# Patient Record
Sex: FEMALE | Race: WHITE | NOT HISPANIC OR LATINO | Employment: FULL TIME | URBAN - NONMETROPOLITAN AREA
[De-identification: names, ages, dates, MRNs, and addresses within clinical notes are randomized per-mention and may not be internally consistent; named-entity substitution may affect disease eponyms.]

---

## 2017-01-09 ENCOUNTER — LAB (OUTPATIENT)
Dept: LAB | Facility: HOSPITAL | Age: 27
End: 2017-01-09

## 2017-01-09 ENCOUNTER — OFFICE VISIT (OUTPATIENT)
Dept: GASTROENTEROLOGY | Facility: CLINIC | Age: 27
End: 2017-01-09

## 2017-01-09 VITALS
SYSTOLIC BLOOD PRESSURE: 132 MMHG | HEIGHT: 64 IN | WEIGHT: 137 LBS | BODY MASS INDEX: 23.39 KG/M2 | DIASTOLIC BLOOD PRESSURE: 82 MMHG | TEMPERATURE: 97.2 F | HEART RATE: 76 BPM

## 2017-01-09 DIAGNOSIS — K21.9 GASTROESOPHAGEAL REFLUX DISEASE WITHOUT ESOPHAGITIS: ICD-10-CM

## 2017-01-09 DIAGNOSIS — K51.00 ULCERATIVE PANCOLITIS WITHOUT COMPLICATION (HCC): Primary | ICD-10-CM

## 2017-01-09 DIAGNOSIS — K51.019 ULCERATIVE PANCOLITIS WITH COMPLICATION (HCC): ICD-10-CM

## 2017-01-09 LAB
ALBUMIN SERPL-MCNC: 4.2 G/DL (ref 3.5–5)
ALBUMIN/GLOB SERPL: 1.2 G/DL (ref 1.1–2.5)
ALP SERPL-CCNC: 60 U/L (ref 24–120)
ALT SERPL W P-5'-P-CCNC: 37 U/L (ref 0–54)
ANION GAP SERPL CALCULATED.3IONS-SCNC: 12 MMOL/L (ref 4–13)
AST SERPL-CCNC: 22 U/L (ref 7–45)
BASOPHILS # BLD AUTO: 0.03 10*3/MM3 (ref 0–0.2)
BASOPHILS NFR BLD AUTO: 0.3 % (ref 0–2)
BILIRUB SERPL-MCNC: 0.3 MG/DL (ref 0.1–1)
BUN BLD-MCNC: 11 MG/DL (ref 5–21)
BUN/CREAT SERPL: 12.6 (ref 7–25)
CALCIUM SPEC-SCNC: 9.1 MG/DL (ref 8.4–10.4)
CHLORIDE SERPL-SCNC: 104 MMOL/L (ref 98–110)
CO2 SERPL-SCNC: 25 MMOL/L (ref 24–31)
CREAT BLD-MCNC: 0.87 MG/DL (ref 0.5–1.4)
CRP SERPL-MCNC: <0.5 MG/DL (ref 0–0.99)
DEPRECATED RDW RBC AUTO: 48.8 FL (ref 40–54)
EOSINOPHIL # BLD AUTO: 0.11 10*3/MM3 (ref 0–0.7)
EOSINOPHIL NFR BLD AUTO: 1.2 % (ref 0–4)
ERYTHROCYTE [DISTWIDTH] IN BLOOD BY AUTOMATED COUNT: 17.3 % (ref 12–15)
GFR SERPL CREATININE-BSD FRML MDRD: 78 ML/MIN/1.73
GLOBULIN UR ELPH-MCNC: 3.6 GM/DL
GLUCOSE BLD-MCNC: 87 MG/DL (ref 70–100)
HCT VFR BLD AUTO: 35.1 % (ref 37–47)
HGB BLD-MCNC: 11 G/DL (ref 12–16)
IMM GRANULOCYTES # BLD: 0 10*3/MM3 (ref 0–0.03)
IMM GRANULOCYTES NFR BLD: 0 % (ref 0–5)
LYMPHOCYTES # BLD AUTO: 2.11 10*3/MM3 (ref 0.72–4.86)
LYMPHOCYTES NFR BLD AUTO: 22.2 % (ref 15–45)
MCH RBC QN AUTO: 24.6 PG (ref 28–32)
MCHC RBC AUTO-ENTMCNC: 31.3 G/DL (ref 33–36)
MCV RBC AUTO: 78.5 FL (ref 82–98)
MONOCYTES # BLD AUTO: 0.57 10*3/MM3 (ref 0.19–1.3)
MONOCYTES NFR BLD AUTO: 6 % (ref 4–12)
NEUTROPHILS # BLD AUTO: 6.67 10*3/MM3 (ref 1.87–8.4)
NEUTROPHILS NFR BLD AUTO: 70.3 % (ref 39–78)
PLATELET # BLD AUTO: 455 10*3/MM3 (ref 130–400)
PMV BLD AUTO: 10.1 FL (ref 6–12)
POTASSIUM BLD-SCNC: 4.1 MMOL/L (ref 3.5–5.3)
PROT SERPL-MCNC: 7.8 G/DL (ref 6.3–8.7)
RBC # BLD AUTO: 4.47 10*6/MM3 (ref 4.2–5.4)
SODIUM BLD-SCNC: 141 MMOL/L (ref 135–145)
WBC NRBC COR # BLD: 9.49 10*3/MM3 (ref 4.8–10.8)

## 2017-01-09 PROCEDURE — 86140 C-REACTIVE PROTEIN: CPT | Performed by: INTERNAL MEDICINE

## 2017-01-09 PROCEDURE — 80053 COMPREHEN METABOLIC PANEL: CPT | Performed by: INTERNAL MEDICINE

## 2017-01-09 PROCEDURE — 99213 OFFICE O/P EST LOW 20 MIN: CPT | Performed by: INTERNAL MEDICINE

## 2017-01-09 PROCEDURE — 85025 COMPLETE CBC W/AUTO DIFF WBC: CPT | Performed by: INTERNAL MEDICINE

## 2017-01-09 PROCEDURE — 36415 COLL VENOUS BLD VENIPUNCTURE: CPT

## 2017-01-09 PROCEDURE — 82542 COL CHROMOTOGRAPHY QUAL/QUAN: CPT | Performed by: INTERNAL MEDICINE

## 2017-01-09 RX ORDER — NICOTINE POLACRILEX 2 MG
GUM BUCCAL
COMMUNITY
End: 2017-07-10 | Stop reason: SDUPTHER

## 2017-01-09 NOTE — MR AVS SNAPSHOT
Maya Fields   1/9/2017 3:15 PM   Office Visit    Dept Phone:  [phone]   Encounter #:  [id number]    Provider:  Jordan Mcdaniel MD   Department:  [facility]                Your Full Care Plan              Your Updated Medication List          This list is accurate as of: 1/9/17  3:41 PM.  Always use your most recent med list.                azaTHIOprine 50 MG tablet   Commonly known as:  IMURAN   Take 2 tablets by mouth Daily.       BIFERARX 22-6-1-0.025 MG tablet   Generic drug:  Poly Fe Hzkgh-TzRgaNcdo-QS-B12       * Biotin 1 MG capsule       * Biotin 1 MG capsule       citalopram 10 MG tablet   Commonly known as:  CeleXA       dicyclomine 10 MG capsule   Commonly known as:  BENTYL       doxycycline 50 MG capsule   Commonly known as:  MONODOX       FE TABS 325 (65 FE) MG EC tablet   Generic drug:  ferrous sulfate       GIANVI 3-0.02 MG per tablet   Generic drug:  drospirenone-ethinyl estradiol       Iron (Ferrous Gluconate) 256 (28 FE) MG tablet       lansoprazole 15 MG capsule   Commonly known as:  PREVACID       mesalamine 0.375 G 24 hr capsule   Commonly known as:  APRISO   Take 4 capsules by mouth daily.       mirtazapine 15 MG tablet   Commonly known as:  REMERON       * PROBIOTIC PRODUCT PO       * PROBIOTIC PRODUCT PO       sertraline 50 MG tablet   Commonly known as:  ZOLOFT       * Notice:  This list has 4 medication(s) that are the same as other medications prescribed for you. Read the directions carefully, and ask your doctor or other care provider to review them with you.            Instructions     None    Patient Instructions History      Upcoming Appointments     Visit Type Date Time Department    FOLLOW UP 1/9/2017  3:15 PM [facility]    LAB 1/9/2017 11:00 AM  [facility]    OFFICE VISIT 7/10/2017  1:30 PM [facility]      MyChart Signup     [facility] [employer] allows you to send messages to your doctor, view your  "test results, renew your prescriptions, schedule appointments, and more. To sign up, go to [employer] and click on the Sign Up Now link in the New User? box. Enter your [employer] Activation Code exactly as it appears below along with the last four digits of your Social Security Number and your Date of Birth () to complete the sign-up process. If you do not sign up before the expiration date, you must request a new code.    [employer] Activation Code: [email or web address]  Expires: 2017  4:35 AM    If you have questions, you can email [email or web address] or call [phone] to talk to our [employer] staff. Remember, [employer] is NOT to be used for urgent needs. For medical emergencies, dial 911.               Other Info from Your Visit           Your Appointments     Jul 10, 2017  1:30 PM CDT   Office Visit with Jordan Mcdaniel MD   [facility] (--)    [address] [place] [id number] [phone]           Arrive 15 minutes prior to appointment.              Allergies     No Known Allergies      Reason for Visit     Follow-up colonoscopy      Vital Signs     Blood Pressure Pulse Temperature Height Weight Body Mass Index    132/82 (BP Location: Left arm, Patient Position: Sitting, Cuff Size: Adult) 76 97.2 °F (36.2 °C) 64\" (162.6 cm) 137 lb (62.1 kg) 23.52 kg/m2    Smoking Status                   Former Smoker             "

## 2017-01-09 NOTE — LETTER
January 9, 2017     Anand Zimmer MD  [address] [place] [address] [address]  [place] [address]    Patient: Maya Fields   YOB: 1990   Date of Visit: 1/9/2017       Dear Dr. Goran MD:    Thank you for referring Maya Fields to me for evaluation. Below are the relevant portions of my assessment and plan of care.         Maya was seen today for follow-up.    Diagnoses and all orders for this visit:    Ulcerative pancolitis without complication    Gastroesophageal reflux disease without esophagitis      Overall improved.  It still multiple difficult to tell because she never really had much symptoms although she had moderate pancolitis on colonoscopy.  Seems now that she is realized and she was having more symptoms than what she admitted to.  She is going to continue with Imuran 100 mg daily.  I told her she does not have to take in divided dose but can take 100 mg at one time each day.  She will continue with Apriso.  I will see her back here in 6 months.  At that time if she continues do well we will schedule her a colonoscopy to assure disease resolution and for surveillance.  We talked about colon cancer risk with ulcerative colitis again.  We talked about the side effects of the medications.  We talked about the need for repeat labs in 3 months.  I reviewed her labs today with her.  They were all unremarkable except for mild anemia which has improved.  She is scheduled to get labs again in 3 months at her primary care physician's office i.e. Dr. zimmer.  She agrees to ask to have those labs forwarded to me.  She understands that I do recommend routine lab checking approximately every 3-4 months.  Continue ongoing management by primary care provider and other specialists.     EMR Dragon/transcription disclaimer:  Much of this encounter note is electronic transcription/translation of spoken language to printed text.  The electronic translation of spoken language may be erroneous, or at times, nonsensical words or  phrases may be inadvertently transcribed.  Although I have reviewed the note for such errors, some may still exist.    Jordan Mcdaniel MD  3:49 PM  01/09/17                If you have questions, please do not hesitate to call me. I look forward to following Maya along with you.         Sincerely,        Jordan Mcdaniel MD        CC: No Recipients

## 2017-01-09 NOTE — PROGRESS NOTES
[facility]astro[facility]    Chief Complaint   Patient presents with   • Follow-up     colonoscopy       Subjective     HPI    Maya Fields is a 26 y.o. female who presents with a chief complaint of  ulcerative colitis.  When she was in early November, she just had a colonoscopy showing active disease.  She was downplaying her symptoms somewhat.  We elected to start Imuran 100 mg to be used with her Apriso.  She comes in now for follow-up evaluation.  She states she finally is truly feeling better.  She has 1 bowel movement a day.  Denies melena or bright red blood per rectum.  She is now eating a full meal without getting up to go to the bathroom.  She denies nocturnal symptoms.  She is gaining weight.  Denies any extraintestinal symptoms.  She does have a little bit nauseated with the a.m. dose of the Imuran.  She takes 50 mg twice a day and tolerates the p.m. dose quite well.       Past Medical History   Diagnosis Date   • Anemia    • GERD (gastroesophageal reflux disease)    • Nephrolithiasis    • UC (ulcerative colitis)      Diagnosis 2011, pancolitis         Current Outpatient Prescriptions:   •  azaTHIOprine (IMURAN) 50 MG tablet, Take 2 tablets by mouth Daily., Disp: 60 tablet, Rfl: 11  •  Biotin 1 MG capsule, by Does not apply route.  , Disp: , Rfl:   •  dicyclomine (BENTYL) 10 MG capsule, TAKE ONE CAPSULE BY MOUTH 3 TIMES A DAY **BEFORE MEALS**, Disp: , Rfl: 5  •  doxycycline (MONODOX) 50 MG capsule, , Disp: , Rfl:   •  ferrous sulfate (FE TABS) 325 (65 FE) MG EC tablet, Take 1 tablet by mouth 2 times daily for 30 days., Disp: , Rfl:   •  GIANVI 3-0.02 MG per tablet, , Disp: , Rfl:   •  lansoprazole (PREVACID) 15 MG capsule, Take 15 mg by mouth Daily., Disp: , Rfl:   •  mesalamine (APRISO) 0.375 G 24 hr capsule, Take 4 capsules by mouth daily., Disp: 120 capsule, Rfl: 11  •  Poly Fe Uxeje-TkVdrLqmc-IL-B12 (BIFERARX) 22-6-1-0.025 MG tablet, Take 1 tablet by mouth daily., Disp: , Rfl:   •  sertraline  (ZOLOFT) 50 MG tablet, Take 50 mg by mouth Daily., Disp: , Rfl:   •  Biotin 1 MG capsule, by Does not apply route.  , Disp: , Rfl:   •  citalopram (CeleXA) 10 MG tablet, TAKE 1 TABLET BY MOUTH EVERY DAY, Disp: , Rfl: 5  •  Iron, Ferrous Gluconate, 256 (28 FE) MG tablet, Take 1 tablet by mouth Daily., Disp: , Rfl:   •  mirtazapine (REMERON) 15 MG tablet, , Disp: , Rfl:   •  PROBIOTIC PRODUCT PO, Take  by mouth.  , Disp: , Rfl:   •  PROBIOTIC PRODUCT PO, Take  by mouth.  , Disp: , Rfl:     No Known Allergies    Social History     Social History   • Marital status: Single     Spouse name: N/A   • Number of children: N/A   • Years of education: N/A     Occupational History   • Not on file.     Social History Main Topics   • Smoking status: Former Smoker   • Smokeless tobacco: Never Used   • Alcohol use No   • Drug use: No   • Sexual activity: Not on file     Other Topics Concern   • Not on file     Social History Narrative       Family History   Problem Relation Age of Onset   • Colon cancer Paternal Grandfather        Review of Systems   Constitutional: Negative for chills and fever.   Cardiovascular: Negative for chest pain and palpitations.   Gastrointestinal: Negative for abdominal distention, abdominal pain, anal bleeding, blood in stool, constipation, diarrhea, nausea, rectal pain and vomiting.         Objective     Vitals:    01/09/17 1519   BP: 132/82   Pulse: 76   Temp: 97.2 °F (36.2 °C)       Physical Exam   Constitutional: She is cooperative. No distress.   HENT:   Head: Normocephalic and atraumatic. Head is without contusion.   Eyes: EOM are normal.   Neck: Neck supple. No JVD present.   Cardiovascular: Normal rate, regular rhythm, S1 normal, S2 normal and normal heart sounds.    No murmur heard.  Pulmonary/Chest: Effort normal and breath sounds normal. No apnea. No respiratory distress.   Abdominal: Soft. Bowel sounds are normal. She exhibits no distension and no mass. There is no hepatosplenomegaly. There  is no tenderness. There is no rebound, no guarding and negative Mariano's sign.   Musculoskeletal: She exhibits no edema or tenderness.   Neurological: She is alert.   Skin: She is not diaphoretic. No pallor.   Psychiatric: She has a normal mood and affect. Her behavior is normal.   Nursing note and vitals reviewed.        Assessment/Plan      Maya was seen today for follow-up.    Diagnoses and all orders for this visit:    Ulcerative pancolitis without complication    Gastroesophageal reflux disease without esophagitis      Overall improved.  It still multiple difficult to tell because she never really had much symptoms although she had moderate pancolitis on colonoscopy.  Seems now that she is realized and she was having more symptoms than what she admitted to.  She is going to continue with Imuran 100 mg daily.  I told her she does not have to take in divided dose but can take 100 mg at one time each day.  She will continue with Apriso.  I will see her back here in 6 months.  At that time if she continues do well we will schedule her a colonoscopy to assure disease resolution and for surveillance.  We talked about colon cancer risk with ulcerative colitis again.  We talked about the side effects of the medications.  We talked about the need for repeat labs in 3 months.  I reviewed her labs today with her.  They were all unremarkable except for mild anemia which has improved.  She is scheduled to get labs again in 3 months at her primary care physician's office i.e. Dr. beaulieu.  She agrees to ask to have those labs forwarded to me.  She understands that I do recommend routine lab checking approximately every 3-4 months.  Continue ongoing management by primary care provider and other specialists.     EMR Dragon/transcription disclaimer:  Much of this encounter note is electronic transcription/translation of spoken language to printed text.  The electronic translation of spoken language may be erroneous, or at times,  nonsensical words or phrases may be inadvertently transcribed.  Although I have reviewed the note for such errors, some may still exist.    Jordan Mcdaniel MD  3:49 PM  01/09/17

## 2017-01-16 LAB
6-TGN ENTSUB RBC: 57 PMOL/8X 10E8
6MMP ENTSUB RBC: 240 PMOL/8X 10E8

## 2017-04-12 ENCOUNTER — TRANSCRIBE ORDERS (OUTPATIENT)
Dept: ADMINISTRATIVE | Facility: HOSPITAL | Age: 27
End: 2017-04-12

## 2017-04-12 ENCOUNTER — APPOINTMENT (OUTPATIENT)
Dept: LAB | Facility: HOSPITAL | Age: 27
End: 2017-04-12
Attending: INTERNAL MEDICINE

## 2017-04-12 DIAGNOSIS — K51.90 ULCERATIVE COLITIS WITHOUT COMPLICATIONS, UNSPECIFIED LOCATION (HCC): ICD-10-CM

## 2017-04-12 DIAGNOSIS — D50.9 IRON DEFICIENCY ANEMIA, UNSPECIFIED: Primary | ICD-10-CM

## 2017-04-12 LAB
ALBUMIN SERPL-MCNC: 3.8 G/DL (ref 3.5–5)
ALBUMIN/GLOB SERPL: 1.3 G/DL (ref 1.1–2.5)
ALP SERPL-CCNC: 51 U/L (ref 24–120)
ALT SERPL W P-5'-P-CCNC: 22 U/L (ref 0–54)
ANION GAP SERPL CALCULATED.3IONS-SCNC: 10 MMOL/L (ref 4–13)
AST SERPL-CCNC: 29 U/L (ref 7–45)
BASOPHILS # BLD AUTO: 0.02 10*3/MM3 (ref 0–0.2)
BASOPHILS NFR BLD AUTO: 0.3 % (ref 0–2)
BILIRUB SERPL-MCNC: 0.4 MG/DL (ref 0.1–1)
BUN BLD-MCNC: 15 MG/DL (ref 5–21)
BUN/CREAT SERPL: 16.7 (ref 7–25)
CALCIUM SPEC-SCNC: 9.1 MG/DL (ref 8.4–10.4)
CHLORIDE SERPL-SCNC: 106 MMOL/L (ref 98–110)
CO2 SERPL-SCNC: 25 MMOL/L (ref 24–31)
CREAT BLD-MCNC: 0.9 MG/DL (ref 0.5–1.4)
CRP SERPL-MCNC: <0.5 MG/DL (ref 0–0.99)
DEPRECATED RDW RBC AUTO: 49.2 FL (ref 40–54)
EOSINOPHIL # BLD AUTO: 0.09 10*3/MM3 (ref 0–0.7)
EOSINOPHIL NFR BLD AUTO: 1.3 % (ref 0–4)
ERYTHROCYTE [DISTWIDTH] IN BLOOD BY AUTOMATED COUNT: 17 % (ref 12–15)
FERRITIN SERPL-MCNC: 4.26 NG/ML (ref 6.24–137)
GFR SERPL CREATININE-BSD FRML MDRD: 75 ML/MIN/1.73
GLOBULIN UR ELPH-MCNC: 3 GM/DL
GLUCOSE BLD-MCNC: 87 MG/DL (ref 70–100)
HCT VFR BLD AUTO: 31.9 % (ref 37–47)
HGB BLD-MCNC: 10.2 G/DL (ref 12–16)
IMM GRANULOCYTES # BLD: 0.02 10*3/MM3 (ref 0–0.03)
IMM GRANULOCYTES NFR BLD: 0.3 % (ref 0–5)
LYMPHOCYTES # BLD AUTO: 2.06 10*3/MM3 (ref 0.72–4.86)
LYMPHOCYTES NFR BLD AUTO: 28.9 % (ref 15–45)
MCH RBC QN AUTO: 25.5 PG (ref 28–32)
MCHC RBC AUTO-ENTMCNC: 32 G/DL (ref 33–36)
MCV RBC AUTO: 79.8 FL (ref 82–98)
MONOCYTES # BLD AUTO: 0.53 10*3/MM3 (ref 0.19–1.3)
MONOCYTES NFR BLD AUTO: 7.4 % (ref 4–12)
NEUTROPHILS # BLD AUTO: 4.41 10*3/MM3 (ref 1.87–8.4)
NEUTROPHILS NFR BLD AUTO: 61.8 % (ref 39–78)
PLATELET # BLD AUTO: 331 10*3/MM3 (ref 130–400)
PMV BLD AUTO: 10.5 FL (ref 6–12)
POTASSIUM BLD-SCNC: 3.9 MMOL/L (ref 3.5–5.3)
PROT SERPL-MCNC: 6.8 G/DL (ref 6.3–8.7)
RBC # BLD AUTO: 4 10*6/MM3 (ref 4.2–5.4)
SODIUM BLD-SCNC: 141 MMOL/L (ref 135–145)
WBC NRBC COR # BLD: 7.13 10*3/MM3 (ref 4.8–10.8)

## 2017-04-12 PROCEDURE — 80053 COMPREHEN METABOLIC PANEL: CPT | Performed by: INTERNAL MEDICINE

## 2017-04-12 PROCEDURE — 85025 COMPLETE CBC W/AUTO DIFF WBC: CPT | Performed by: INTERNAL MEDICINE

## 2017-04-12 PROCEDURE — 82728 ASSAY OF FERRITIN: CPT | Performed by: INTERNAL MEDICINE

## 2017-04-12 PROCEDURE — 86140 C-REACTIVE PROTEIN: CPT | Performed by: INTERNAL MEDICINE

## 2017-04-12 PROCEDURE — 36415 COLL VENOUS BLD VENIPUNCTURE: CPT

## 2017-07-10 ENCOUNTER — OFFICE VISIT (OUTPATIENT)
Dept: GASTROENTEROLOGY | Facility: CLINIC | Age: 27
End: 2017-07-10

## 2017-07-10 VITALS
OXYGEN SATURATION: 99 % | SYSTOLIC BLOOD PRESSURE: 126 MMHG | WEIGHT: 140 LBS | BODY MASS INDEX: 23.9 KG/M2 | TEMPERATURE: 98.5 F | DIASTOLIC BLOOD PRESSURE: 70 MMHG | HEART RATE: 72 BPM | HEIGHT: 64 IN

## 2017-07-10 DIAGNOSIS — K21.9 GASTROESOPHAGEAL REFLUX DISEASE WITHOUT ESOPHAGITIS: ICD-10-CM

## 2017-07-10 DIAGNOSIS — K51.00 ULCERATIVE PANCOLITIS WITHOUT COMPLICATION (HCC): Primary | ICD-10-CM

## 2017-07-10 PROCEDURE — 99213 OFFICE O/P EST LOW 20 MIN: CPT | Performed by: INTERNAL MEDICINE

## 2017-07-10 RX ORDER — SODIUM CHLORIDE 0.9 % (FLUSH) 0.9 %
1-10 SYRINGE (ML) INJECTION AS NEEDED
Status: CANCELLED | OUTPATIENT
Start: 2017-07-10

## 2017-07-10 RX ORDER — LIDOCAINE HYDROCHLORIDE 10 MG/ML
0.1 INJECTION, SOLUTION EPIDURAL; INFILTRATION; INTRACAUDAL; PERINEURAL ONCE AS NEEDED
Status: CANCELLED | OUTPATIENT
Start: 2017-07-10

## 2017-07-10 RX ORDER — BUSPIRONE HYDROCHLORIDE 15 MG/1
TABLET ORAL
COMMUNITY
Start: 2017-04-10 | End: 2020-09-14

## 2017-07-10 NOTE — PROGRESS NOTES
[facility] Gastroenterology    Chief Complaint   Patient presents with   • Follow-up       Subjective     HPI    Maya Fields is a 26 y.o. female who presents with a chief complaint of  Ulcerative colitis.  She is doing well.  She has been on Imuran since her colonoscopy approximately last October which showed pancolitis.  She states she is having one bowel movement a day.  If she gets a little anxious she might have to and around her menses she might have 2.  They are formed.  There is no mucus or blood.  Denies abdominal pain.  She has had no infectious symptoms.  Everything is going well.       Past Medical History:   Diagnosis Date   • Anemia    • GERD (gastroesophageal reflux disease)    • Nephrolithiasis    • UC (ulcerative colitis)     Diagnosis 2011, pancolitis       Past Surgical History:   Procedure Laterality Date   • CHOLECYSTECTOMY     • COLONOSCOPY     • COLONOSCOPY Left 10/17/2016    Procedure: COLONOSCOPY WITH ANESTHESIA;  Surgeon: Jordan Mcdaniel MD;  Location: [facility] ENDOSCOPY;  Service:          Current Outpatient Prescriptions:   •  azaTHIOprine (IMURAN) 50 MG tablet, Take 2 tablets by mouth Daily., Disp: 60 tablet, Rfl: 11  •  Biotin 1 MG capsule, by Does not apply route.  , Disp: , Rfl:   •  busPIRone (BUSPAR) 15 MG tablet, , Disp: , Rfl:   •  citalopram (CeleXA) 10 MG tablet, TAKE 1 TABLET BY MOUTH EVERY DAY, Disp: , Rfl: 5  •  doxycycline (MONODOX) 50 MG capsule, , Disp: , Rfl:   •  ferrous sulfate (FE TABS) 325 (65 FE) MG EC tablet, Take 1 tablet by mouth 2 times daily for 30 days., Disp: , Rfl:   •  GIANVI 3-0.02 MG per tablet, , Disp: , Rfl:   •  lansoprazole (PREVACID) 15 MG capsule, Take 15 mg by mouth Daily., Disp: , Rfl:   •  mesalamine (APRISO) 0.375 G 24 hr capsule, Take 4 capsules by mouth daily., Disp: 120 capsule, Rfl: 11  •  Poly Fe Valus-YiCvxRqav-FH-B12 (BIFERARX) 22-6-1-0.025 MG tablet, Take 1 tablet by mouth daily., Disp: , Rfl:   •  Sod Picosulfate-Mag Ox-Cit Acd  (PREPOPIK) 10-3.5-12 MG-GM-GM pack, Take 1 package by mouth See Admin Instructions., Disp: 1 each, Rfl: 0    No Known Allergies    Social History     Social History   • Marital status: Single     Spouse name: N/A   • Number of children: N/A   • Years of education: N/A     Occupational History   • Not on file.     Social History Main Topics   • Smoking status: Former Smoker   • Smokeless tobacco: Never Used   • Alcohol use No   • Drug use: No   • Sexual activity: Not on file     Other Topics Concern   • Not on file     Social History Narrative       Family History   Problem Relation Age of Onset   • Colon cancer Paternal Grandfather    • Crohn's disease Cousin        Review of Systems   Constitutional: Negative for chills and fever.   Cardiovascular: Negative for chest pain and palpitations.   Gastrointestinal: Negative for abdominal distention, abdominal pain, anal bleeding, blood in stool, constipation, diarrhea, nausea, rectal pain and vomiting.         Objective     Vitals:    07/10/17 1327   BP: 126/70   Pulse: 72   Temp: 98.5 °F (36.9 °C)   SpO2: 99%       Physical Exam  No acute distress. Vital signs as documented. Skin warm and dry and without overt rashes. EOMI, sclera anicteric.  Neck without JVD or masses. Lungs clear to auscultation bilaterally, no rales. Heart exam notable for regular rhythm, normal sounds and absence of loud murmurs, rubs or gallops. Abdomen is soft, nontender, non distended, normal bowel sounds and without evidence of organomegaly, masses, or abdominal aortic enlargement. Extremities nonedematous, no cyanosis. Neuro alert, moves extremities.    Assessment/Plan      Maya was seen today for follow-up.    Diagnoses and all orders for this visit:    Ulcerative pancolitis without complication  -     CBC & Differential  -     Comprehensive Metabolic Panel  -     C-reactive Protein  -     Case Request; Standing  -     sodium chloride 0.9 % flush 1-10 mL; Infuse 1-10 mL into a venous catheter  As Needed for Line Care.  -     lidocaine PF 1% (XYLOCAINE) injection 0.1 mL; Inject 0.1 mL into the skin 1 (One) Time As Needed (for IV access).  -     Case Request    Gastroesophageal reflux disease without esophagitis    Other orders  -     Implement Anesthesia Orders Day of Procedure; Standing  -     Obtain Informed Consent; Standing  -     Verify bowel prep was successful; Standing  -     Insert Peripheral IV; Standing  -     Saline Lock & Maintain IV Access; Standing  -     Sod Picosulfate-Mag Ox-Cit Acd (PREPOPIK) 10-3.5-12 MG-GM-GM pack; Take 1 package by mouth See Admin Instructions.      She is clinically doing well.  She is tolerating Imuran and Apriso and will continue.  We will plan a colonoscopy this October 1 year from the last one to see how her mucosa has responded to the Imuran.  Symptomatically she is done quite well.  Get labs.  I did discuss with her the need to get labs every 3-4 months approximately.  Also advised a flu shot this coming October November.  I will see her back in the office in one year and in October for a colonoscopy.  Continue ongoing management by primary care provider and other specialists.     EMR Dragon/transcription disclaimer:  Much of this encounter note is electronic transcription/translation of spoken language to printed text.  The electronic translation of spoken language may be erroneous, or at times, nonsensical words or phrases may be inadvertently transcribed.  Although I have reviewed the note for such errors, some may still exist.    Jordan Mcdaniel MD  2:04 PM  07/10/17

## 2018-01-22 RX ORDER — MESALAMINE 0.38 G/1
1500 CAPSULE, EXTENDED RELEASE ORAL DAILY
Qty: 360 CAPSULE | Refills: 0 | Status: SHIPPED | OUTPATIENT
Start: 2018-01-22 | End: 2018-02-06 | Stop reason: SDUPTHER

## 2018-02-06 ENCOUNTER — OFFICE VISIT (OUTPATIENT)
Dept: GASTROENTEROLOGY | Facility: CLINIC | Age: 28
End: 2018-02-06

## 2018-02-06 VITALS
TEMPERATURE: 97.2 F | SYSTOLIC BLOOD PRESSURE: 126 MMHG | BODY MASS INDEX: 22.71 KG/M2 | HEIGHT: 64 IN | DIASTOLIC BLOOD PRESSURE: 84 MMHG | OXYGEN SATURATION: 100 % | WEIGHT: 133 LBS | HEART RATE: 70 BPM

## 2018-02-06 DIAGNOSIS — K51.00 ULCERATIVE PANCOLITIS WITHOUT COMPLICATION (HCC): Primary | ICD-10-CM

## 2018-02-06 PROCEDURE — 99213 OFFICE O/P EST LOW 20 MIN: CPT | Performed by: NURSE PRACTITIONER

## 2018-02-06 RX ORDER — MESALAMINE 0.38 G/1
1500 CAPSULE, EXTENDED RELEASE ORAL DAILY
Qty: 120 CAPSULE | Refills: 10 | Status: SHIPPED | OUTPATIENT
Start: 2018-02-06 | End: 2020-07-02 | Stop reason: SDUPTHER

## 2018-02-06 NOTE — PROGRESS NOTES
[facility]    2/6/2018    Maya Fields   1990    Primary Physician: Anand Zimmer MD    Chief Complaint   Patient presents with   • Colonoscopy   ulcerative colitis      HISTORY OF PRESENT ILLNESS    Maya Fields is a 27 y.o. female presents  with ulcerative colitis.  She is currently taking Apriso 4 pills daily and Imuran 100 mg daily.  She is having 2 bowel movements a day.  She was out of the Apriso for about a month.  No rectal bleeding.  No abdominal pain.  No nausea vomiting.  No fevers or chills.  No unexplained weight loss.  No skin rashes.  No joint pain.  No ocular pain.  Last colonoscopy was October 2016 noting pancolitis and a colon polyp, recommended recall was 6-12 months to check healing.  She has not had a chest x-ray or T spot within the last year.  She does need a refill of Apriso.  We will get her scheduled for a colonoscopy.           Ov  7/2017 with dr baker :Maya Fields is a 26 y.o. female who presents with a chief complaint of  Ulcerative colitis.  She is doing well.  She has been on Imuran since her colonoscopy approximately last October which showed pancolitis.  She states she is having one bowel movement a day.  If she gets a little anxious she might have to and around her menses she might have 2.  They are formed.  There is no mucus or blood.  Denies abdominal pain.  She has had no infectious symptoms.  Everything is going well.        Past Medical History:   Diagnosis Date   • Anemia    • GERD (gastroesophageal reflux disease)    • Nephrolithiasis    • UC (ulcerative colitis)     Diagnosis 2011, pancolitis       Past Surgical History:   Procedure Laterality Date   • CHOLECYSTECTOMY     • COLONOSCOPY     • COLONOSCOPY Left 10/17/2016    Procedure: COLONOSCOPY WITH ANESTHESIA;  Surgeon: Jordan Baker MD;  Location: [facility] ENDOSCOPY;  Service:        Outpatient Prescriptions Marked as Taking for the 2/6/18 encounter (Office Visit) with Sharon IVY  MARGOT Villa   Medication Sig Dispense Refill   • azaTHIOprine (IMURAN) 50 MG tablet Take 2 tablets by mouth Daily. 60 tablet 11   • Biotin 1 MG capsule by Does not apply route.       • busPIRone (BUSPAR) 15 MG tablet      • doxycycline (MONODOX) 50 MG capsule      • ferrous sulfate (FE TABS) 325 (65 FE) MG EC tablet Take 1 tablet by mouth 2 times daily for 30 days.     • GIANVI 3-0.02 MG per tablet      • lansoprazole (PREVACID) 15 MG capsule Take 15 mg by mouth Daily.     • mesalamine (APRISO) 0.375 g 24 hr capsule Take 4 capsules by mouth Daily. 120 capsule 10   • Poly Fe Krkhg-QyJmvMjqd-EZ-B12 (BIFERARX) 22-6-1-0.025 MG tablet Take 1 tablet by mouth daily.     • [DISCONTINUED] mesalamine (APRISO) 0.375 g 24 hr capsule Take 4 capsules by mouth Daily. 360 capsule 0       No Known Allergies    Social History     Social History   • Marital status: Single     Spouse name: N/A   • Number of children: N/A   • Years of education: N/A     Occupational History   • Not on file.     Social History Main Topics   • Smoking status: Former Smoker   • Smokeless tobacco: Never Used   • Alcohol use No   • Drug use: No   • Sexual activity: Not on file     Other Topics Concern   • Not on file     Social History Narrative       Family History   Problem Relation Age of Onset   • Crohn's disease Cousin    • Colon cancer Maternal Grandfather        Review of Systems   Constitutional: Negative for chills and fever.   Eyes: Negative for pain.   Respiratory: Negative for cough, shortness of breath and wheezing.    Cardiovascular: Negative for chest pain and palpitations.   Gastrointestinal: Negative for abdominal distention, abdominal pain, anal bleeding, blood in stool, constipation, diarrhea, nausea, rectal pain and vomiting.   Musculoskeletal: Negative for arthralgias.   Skin: Negative for rash.        Vitals:    02/06/18 0813   BP: 126/84   BP Location: Left arm   Patient Position: Sitting   Cuff Size: Adult   Pulse: 70   Temp: 97.2 °F  "(36.2 °C)   SpO2: 100%   Weight: 60.3 kg (133 lb)   Height: 162.6 cm (64\")      Body mass index is 22.83 kg/(m^2).    Physical Exam   Constitutional: She is oriented to person, place, and time. She appears well-developed and well-nourished. No distress.   Cardiovascular: Normal rate, regular rhythm and normal heart sounds.    Pulmonary/Chest: Effort normal and breath sounds normal.   Abdominal: Soft. Bowel sounds are normal. She exhibits no distension and no mass. There is no tenderness. There is no rebound and no guarding.   Musculoskeletal: She exhibits no edema.   Neurological: She is alert and oriented to person, place, and time.   Skin: Skin is warm and dry.   Psychiatric: She has a normal mood and affect. Her behavior is normal.   Vitals reviewed.              ASSESSMENT AND PLAN    Maya was seen today for colonoscopy.    Diagnoses and all orders for this visit:    Ulcerative pancolitis without complication  -     CBC & Differential  -     Hepatic Function Panel  -     TSPOT; Future  -     XR Chest 2 View; Future  -     Case Request; Standing  -     Case Request    Other orders  -     Implement Anesthesia Orders Day of Procedure; Standing  -     Obtain Informed Consent; Standing  -     Sod Picosulfate-Mag Ox-Cit Acd (PREPOPIK) 10-3.5-12 MG-GM-GM pack; Take 1 package by mouth See Admin Instructions.  -     mesalamine (APRISO) 0.375 g 24 hr capsule; Take 4 capsules by mouth Daily.    Ulcerative colitis seems to be stable at this time.  We will check CBC, LFTs.  We will also check T spot and chest x-ray.  She is due for colonoscopy and is agreeable to scheduling.  She will continue her in total of 100 mg daily and Apriso 4 tablets daily.  We will see her back in the office in 1 year sooner if any questions concerns or problems.    COLONOSCOPY WITH ANESTHESIA (N/A)   Risk, benefits, and alternatives of endoscopy were explained in full.  They understand that there is a risk of bleeding, perforation, and infection.  " The risk of perforation goes up with esophageal dilation.  Other options to evaluate UGI complaints could involve barium swallow or UGI series, but these would be diagnostic tests only.  Patient was given time to ask questions.  I answered them to their satisfaction and they are agreeable to proceeding    Body mass index is 22.83 kg/(m^2).     Return in about 1 year (around 2/6/2019).            MARGOT Domingo    EMR Dragon/transcription disclaimer:  Much of this encounter note is electronic transcription/translation of spoken language to printed text.  The electronic translation of spoken language may be erroneous, or at times, nonsensical words or phrases may be inadvertently transcribed.  Although I have reviewed the note for such errors, some may still exist.

## 2018-02-07 PROBLEM — K51.00 ULCERATIVE PANCOLITIS WITHOUT COMPLICATION (HCC): Status: ACTIVE | Noted: 2018-02-07

## 2018-03-01 ENCOUNTER — TELEPHONE (OUTPATIENT)
Dept: GASTROENTEROLOGY | Facility: CLINIC | Age: 28
End: 2018-03-01

## 2018-06-11 ENCOUNTER — TELEPHONE (OUTPATIENT)
Dept: GASTROENTEROLOGY | Facility: CLINIC | Age: 28
End: 2018-06-11

## 2018-06-11 NOTE — TELEPHONE ENCOUNTER
Canceled CBC & Diff from 7/10/17. Sharon re-ordered on 2/6/18.    From: Parul Norton MA   Sent: 6/8/2018   9:21 AM   To: Jordan Mcdaniel MD   Subject: OVERDUE LABS-PLEASE ADVISE                       Pt was here 2/6/18 & was seen by Sharon. Pt sched for cscope 3/8/18. CX due to jury duty. To call & r/s.     Sharon placed cbc, hfp, tspot orders. Open orders from 7/10/17 Dr. Mcdaniel placed cbc & diff, cmp, c-reactive protein. Keep or cx old orders?     Thanks,   Parul

## 2018-12-30 ENCOUNTER — NURSE TRIAGE (OUTPATIENT)
Dept: CALL CENTER | Facility: HOSPITAL | Age: 28
End: 2018-12-30

## 2018-12-30 NOTE — TELEPHONE ENCOUNTER
"Caller states that vomiting began on Friday and she also has diarrhea.  Afebrile.    Reason for Disposition  • [1] MILD or MODERATE vomiting AND [2] present > 48 hours (2 days) (Exception: mild vomiting with associated diarrhea)    Additional Information  • Negative: Shock suspected (e.g., cold/pale/clammy skin, too weak to stand, low BP, rapid pulse)  • Negative: Difficult to awaken or acting confused (e.g., disoriented, slurred speech)  • Negative: Sounds like a life-threatening emergency to the triager  • Negative: Vomiting occurs only while coughing  • Negative: [1] Pregnant < 20 Weeks AND [2] nausea/vomiting began in early pregnancy (i.e., 4-8 weeks pregnant)  • Negative: Chest pain  • Negative: Headache is main symptom  • Negative: Vomiting (or Nausea) in a cancer patient who is currently (or recently) receiving chemotherapy or radiation therapy, or cancer patient who has metastatic or end-stage cancer and is receiving palliative care  • Negative: [1] Vomiting AND [2] contains red blood or black (\"coffee ground\") material  (Exception: few red streaks in vomit that only happened once)  • Negative: Severe pain in one eye  • Negative: Recent head injury (within last 3 days)  • Negative: Recent abdominal injury (within last 3 days)  • Negative: [1] Insulin-dependent diabetes (Type I) AND [2] glucose > 400 mg/dl (22 mmol/l)  • Negative: [1] SEVERE vomiting (e.g., 6 or more times/day) AND [2] present > 8 hours  • Negative: [1] MODERATE vomiting (e.g., 3 - 5 times/day) AND [2] age > 60  • Negative: Severe headache (e.g., excruciating) (Exception: similar to previous migraines)  • Negative: High-risk adult (e.g., diabetes mellitus, brain tumor, V-P shunt, hernia)  • Negative: [1] Drinking very little AND [2] dehydration suspected (e.g., no urine > 12 hours, very dry mouth, very lightheaded)  • Negative: Patient sounds very sick or weak to the triager  • Negative: [1] MILD to MODERATE vomiting (e.g., 1-5 times/day) AND " "[2] abdomen looks much more swollen than usual  • Negative: [1] Vomiting AND [2] contains bile (green color)  • Negative: [1] Constant abdominal pain AND [2] present > 2 hours  • Negative: [1] Fever > 103 F (39.4 C) AND [2] not able to get the fever down using Fever Care Advice  • Negative: [1] Fever > 101 F (38.3 C) AND [2] age > 60  • Negative: [1] Fever > 100.0 F (37.8 C) AND [2] bedridden (e.g., nursing home patient, CVA, chronic illness, recovering from surgery)  • Negative: [1] Fever > 100.0 F (37.8 C) AND [2] weak immune system (e.g., HIV positive, cancer chemo, splenectomy, organ transplant, chronic steroids)  • Negative: Taking any of the following medications: digoxin (Lanoxin), lithium, theophylline, phenytoin (Dilantin)    Answer Assessment - Initial Assessment Questions  1. VOMITING SEVERITY: \"How many times have you vomited in the past 24 hours?\"      - MILD:  1 - 2 times/day     - MODERATE: 3 - 5 times/day, decreased oral intake without significant weight loss or symptoms of dehydration     - SEVERE: 6 or more times/day, vomits everything or nearly everything, with significant weight loss, symptoms of dehydration      moderate  2. ONSET: \"When did the vomiting begin?\"       3 days ago  3. FLUIDS: \"What fluids or food have you vomited up today?\" \"Have you been able to keep any fluids down?\"      none  4. ABDOMINAL PAIN: \"Are your having any abdominal pain?\" If yes : \"How bad is it and what does it feel like?\" (e.g., crampy, dull, intermittent, constant)       Yes, crampy  5. DIARRHEA: \"Is there any diarrhea?\" If so, ask: \"How many times today?\"       Yes, several  6. CONTACTS: \"Is there anyone else in the family with the same symptoms?\"       no  7. CAUSE: \"What do you think is causing your vomiting?\"      Not sure  8. HYDRATION STATUS: \"Any signs of dehydration?\" (e.g., dry mouth [not only dry lips], too weak to stand) \"When did you last urinate?\"      No sign of dehydration  9. OTHER SYMPTOMS: \"Do " "you have any other symptoms?\" (e.g., fever, headache, vertigo, vomiting blood or coffee grounds, recent head injury)      no  10. PREGNANCY: \"Is there any chance you are pregnant?\" \"When was your last menstrual period?\"        no    Protocols used: VOMITING-ADULT-AH      "

## 2018-12-31 ENCOUNTER — NURSE TRIAGE (OUTPATIENT)
Dept: CALL CENTER | Facility: HOSPITAL | Age: 28
End: 2018-12-31

## 2018-12-31 ENCOUNTER — TRANSCRIBE ORDERS (OUTPATIENT)
Dept: ADMINISTRATIVE | Facility: HOSPITAL | Age: 28
End: 2018-12-31

## 2018-12-31 ENCOUNTER — APPOINTMENT (OUTPATIENT)
Dept: LAB | Facility: HOSPITAL | Age: 28
End: 2018-12-31

## 2018-12-31 DIAGNOSIS — R19.7 DIARRHEA, UNSPECIFIED TYPE: Primary | ICD-10-CM

## 2018-12-31 LAB
ADV 40+41 DNA STL QL NAA+NON-PROBE: NOT DETECTED
ASTRO TYP 1-8 RNA STL QL NAA+NON-PROBE: DETECTED
C CAYETANENSIS DNA STL QL NAA+NON-PROBE: NOT DETECTED
C DIFF TOX GENS STL QL NAA+PROBE: NOT DETECTED
CAMPY SP DNA.DIARRHEA STL QL NAA+PROBE: NOT DETECTED
CRYPTOSP STL CULT: NOT DETECTED
E COLI DNA SPEC QL NAA+PROBE: NOT DETECTED
E HISTOLYT AG STL-ACNC: NOT DETECTED
EAEC PAA PLAS AGGR+AATA ST NAA+NON-PRB: NOT DETECTED
EC STX1 + STX2 GENES STL NAA+PROBE: NOT DETECTED
EPEC EAE GENE STL QL NAA+NON-PROBE: NOT DETECTED
ETEC LTA+ST1A+ST1B TOX ST NAA+NON-PROBE: NOT DETECTED
G LAMBLIA DNA SPEC QL NAA+PROBE: NOT DETECTED
NOROVIRUS GI+II RNA STL QL NAA+NON-PROBE: NOT DETECTED
P SHIGELLOIDES DNA STL QL NAA+NON-PROBE: NOT DETECTED
RV RNA STL NAA+PROBE: NOT DETECTED
SALMONELLA DNA SPEC QL NAA+PROBE: NOT DETECTED
SAPO I+II+IV+V RNA STL QL NAA+NON-PROBE: NOT DETECTED
SHIGELLA SP+EIEC IPAH STL QL NAA+PROBE: NOT DETECTED
V CHOLERAE DNA SPEC QL NAA+PROBE: NOT DETECTED
VIBRIO DNA SPEC NAA+PROBE: NOT DETECTED
YERSINIA STL CULT: NOT DETECTED

## 2018-12-31 PROCEDURE — 87507 IADNA-DNA/RNA PROBE TQ 12-25: CPT | Performed by: NURSE PRACTITIONER

## 2018-12-31 NOTE — TELEPHONE ENCOUNTER
Reason for Disposition  • Earache  (Exceptions: brief ear pain of < 60 minutes duration, earache occurring during air travel  • Earache also present    Additional Information  • Negative: Moving the earlobe or touching the ear clearly increases the pain  • Negative: Foreign body struck in the ear (e.g., bug, piece of cotton)  • Negative: Followed an ear injury  • Negative: [1] Recently diagnosed with otitis media AND [2] currently on oral antibiotics  • Negative: [1] Stiff neck (unable to touch chin to chest) AND [2] fever  • Negative: [1] Bony area of skull behind the ear is pink or swollen AND [2] fever  • Negative: Fever > 104 F (40 C)  • Negative: Patient sounds very sick or weak to the triager  • Negative: [1] SEVERE pain AND [2] not improved 2 hours after taking analgesic medication (e.g., ibuprofen or acetaminophen)  • Negative: Walking is very unsteady  • Negative: Sudden onset of ear pain after long - thin object was inserted into the ear canal (e.g., pencil, Q-tip)  • Negative: Diabetes mellitus or weak immune system (e.g., HIV positive, cancer chemo, splenectomy, organ transplant, chronic steroids)  • Negative: New blurred vision or vision changes  • Negative: White, yellow, or green discharge  • Negative: Bloody discharge or unexplained bleeding from ear canal  • Negative: Mild earache and ear congestion (fullness) occurring during air travel  • Negative: Severe difficulty breathing (e.g., struggling for each breath, speaks in single words, stridor)  • Negative: Sounds like a life-threatening emergency to the triager  • Negative: [1] Diagnosed strep throat AND [2] taking antibiotic AND [3] symptoms continue  • Negative: Throat culture results, call about  • Negative: Productive cough is main symptom  • Negative: Non-productive cough is main symptom  • Negative: Hoarseness is main symptom  • Negative: Runny nose is main symptom  • Negative: [1] Drooling or spitting out saliva (because can't swallow)  "AND [2] normal breathing  • Negative: Unable to open mouth completely  • Negative: [1] Difficulty breathing AND [2] not severe  • Negative: Fever > 104 F (40 C)  • Negative: [1] Refuses to drink anything AND [2] for > 12 hours  • Negative: [1] Drinking very little AND [2] dehydration suspected (e.g., no urine > 12 hours, very dry mouth, very lightheaded)  • Negative: Patient sounds very sick or weak to the triager  • Negative: SEVERE (e.g., excruciating) throat pain  • Negative: [1] Pus on tonsils (back of throat) AND [2]  fever AND [3] swollen neck lymph nodes (\"glands\")  • Negative: [1] Rash AND [2] widespread (especially chest and abdomen)  • Negative: Fever present > 3 days (72 hours)  • Negative: Diabetes mellitus or weak immune system (e.g., HIV positive, cancer chemo, splenectomy, organ transplant)    Answer Assessment - Initial Assessment Questions  1. LOCATION: \"Which ear is involved?\"      both  2. ONSET: \"When did the ear start hurting\"       Two days ago  3. SEVERITY: \"How bad is the pain?\"  (Scale 1-10; mild, moderate or severe)    - MILD (1-3): doesn't interfere with normal activities     - MODERATE (4-7): interferes with normal activities or awakens from sleep     - SEVERE (8-10): excruciating pain, unable to do any normal activities       moderate  4. URI SYMPTOMS: \" Do you have a runny nose or cough?\"      Sore throat  5. FEVER: \"Do you have a fever?\" If so, ask: \"What is your temperature, how was it measured, and when did it start?\"      denies  6. CAUSE: \"Have you been swimming recently?\", \"How often do you use Q-TIPS?\", \"Have you had any recent air travel or scuba diving?\"      denies  7. OTHER SYMPTOMS: \"Do you have any other symptoms?\" (e.g., headache, stiff neck, dizziness, vomiting, runny nose, decreased hearing)      Neck hurts, fluid in ears  8. PREGNANCY: \"Is there any chance you are pregnant?\" \"When was your last menstrual period?\"      denies    Answer Assessment - Initial Assessment " "Questions  1. ONSET: \"When did the throat start hurting?\" (Hours or days ago)       Last night about 1800  2. SEVERITY: \"How bad is the sore throat?\" (Scale 1-10; mild, moderate or severe)    - MILD (1-3):  doesn't interfere with eating or normal activities    - MODERATE (4-7): interferes with eating some solids and normal activities    - SEVERE (8-10):  excruciating pain, interferes with most normal activities    - SEVERE DYSPHAGIA: can't swallow liquids, drooling      Mild to moderate at times  3. STREP EXPOSURE: \"Has there been any exposure to strep within the past week?\" If so, ask: \"What type of contact occurred?\"       unknown  4.  VIRAL SYMPTOMS: \"Are there any symptoms of a cold, such as a runny nose, cough, hoarse voice or red eyes?\"       Has had diarrhea for three days  5. FEVER: \"Do you have a fever?\" If so, ask: \"What is your temperature, how was it measured, and when did it start?\"      denies  6. PUS ON THE TONSILS: \"Is there pus on the tonsils in the back of your throat?\"      denies  7. OTHER SYMPTOMS: \"Do you have any other symptoms?\" (e.g., difficulty breathing, headache, rash)      earache  8. PREGNANCY: \"Is there any chance you are pregnant?\" \"When was your last menstrual period?\"      denies    Protocols used: EARACHE-ADULT-, SORE THROAT-ADULT-      "

## 2020-07-02 PROCEDURE — 87635 SARS-COV-2 COVID-19 AMP PRB: CPT | Performed by: NURSE PRACTITIONER

## 2020-07-03 ENCOUNTER — TELEPHONE (OUTPATIENT)
Dept: URGENT CARE | Facility: CLINIC | Age: 30
End: 2020-07-03

## 2020-09-14 ENCOUNTER — INITIAL PRENATAL (OUTPATIENT)
Dept: OBSTETRICS AND GYNECOLOGY | Facility: CLINIC | Age: 30
End: 2020-09-14

## 2020-09-14 VITALS — SYSTOLIC BLOOD PRESSURE: 110 MMHG | WEIGHT: 141 LBS | DIASTOLIC BLOOD PRESSURE: 72 MMHG | BODY MASS INDEX: 24.2 KG/M2

## 2020-09-14 DIAGNOSIS — Z34.81 ENCOUNTER FOR SUPERVISION OF OTHER NORMAL PREGNANCY IN FIRST TRIMESTER: Primary | ICD-10-CM

## 2020-09-14 PROBLEM — K51.00 ULCERATIVE PANCOLITIS WITHOUT COMPLICATION (HCC): Status: RESOLVED | Noted: 2018-02-07 | Resolved: 2020-09-14

## 2020-09-14 PROBLEM — Z34.90 SUPERVISION OF NORMAL PREGNANCY: Status: ACTIVE | Noted: 2020-09-14

## 2020-09-14 PROCEDURE — 0501F PRENATAL FLOW SHEET: CPT | Performed by: OBSTETRICS & GYNECOLOGY

## 2020-09-14 NOTE — PROGRESS NOTES
New OB, US today shows 9 weeks gestation, EDC 4/16/21  Having nausea, fatigue, but no headaches  Medical history of ulcerative colitis, last flare 2 years ago  New OB labs today  Discussed OTC Unisom and B6  Discussed normal prenatal routines, option of maternal carrier screening and ffDNA  Questions answered

## 2020-09-18 LAB
ABO GROUP BLD: NORMAL
BACTERIA UR CULT: NORMAL
BACTERIA UR CULT: NORMAL
BASOPHILS # BLD AUTO: 0.04 10*3/MM3 (ref 0–0.2)
BASOPHILS NFR BLD AUTO: 0.4 % (ref 0–1.5)
BLD GP AB SCN SERPL QL: NEGATIVE
C TRACH RRNA SPEC QL NAA+PROBE: NEGATIVE
DRUGS UR: NORMAL
EOSINOPHIL # BLD AUTO: 0.12 10*3/MM3 (ref 0–0.4)
EOSINOPHIL NFR BLD AUTO: 1.2 % (ref 0.3–6.2)
ERYTHROCYTE [DISTWIDTH] IN BLOOD BY AUTOMATED COUNT: 17.3 % (ref 12.3–15.4)
HBV SURFACE AG SERPL QL IA: NEGATIVE
HCT VFR BLD AUTO: 32.5 % (ref 34–46.6)
HGB BLD-MCNC: 9.8 G/DL (ref 12–15.9)
HIV 1+2 AB+HIV1 P24 AG SERPL QL IA: NON REACTIVE
IMM GRANULOCYTES # BLD AUTO: 0.03 10*3/MM3 (ref 0–0.05)
IMM GRANULOCYTES NFR BLD AUTO: 0.3 % (ref 0–0.5)
LYMPHOCYTES # BLD AUTO: 2.5 10*3/MM3 (ref 0.7–3.1)
LYMPHOCYTES NFR BLD AUTO: 24.3 % (ref 19.6–45.3)
MCH RBC QN AUTO: 23.9 PG (ref 26.6–33)
MCHC RBC AUTO-ENTMCNC: 30.2 G/DL (ref 31.5–35.7)
MCV RBC AUTO: 79.3 FL (ref 79–97)
MONOCYTES # BLD AUTO: 0.78 10*3/MM3 (ref 0.1–0.9)
MONOCYTES NFR BLD AUTO: 7.6 % (ref 5–12)
N GONORRHOEA RRNA SPEC QL NAA+PROBE: NEGATIVE
NEUTROPHILS # BLD AUTO: 6.81 10*3/MM3 (ref 1.7–7)
NEUTROPHILS NFR BLD AUTO: 66.2 % (ref 42.7–76)
NRBC BLD AUTO-RTO: 0 /100 WBC (ref 0–0.2)
PLATELET # BLD AUTO: 464 10*3/MM3 (ref 140–450)
RBC # BLD AUTO: 4.1 10*6/MM3 (ref 3.77–5.28)
RH BLD: POSITIVE
RPR SER QL: NON REACTIVE
RUBV IGG SERPL IA-ACNC: 4.88 INDEX
WBC # BLD AUTO: 10.28 10*3/MM3 (ref 3.4–10.8)

## 2020-09-25 ENCOUNTER — TELEPHONE (OUTPATIENT)
Dept: OBSTETRICS AND GYNECOLOGY | Facility: CLINIC | Age: 30
End: 2020-09-25

## 2020-09-25 NOTE — TELEPHONE ENCOUNTER
"OB at 11wks calls with c/o ulcerative colitis that hasn't flared up in \"a long time\" but is now bothering her after eating foods that have bothered her in the past. Instructed pt to avoid foods that she knows will cause flare ups. Pt asks if she can take gas-x, after speaking with Dr. Wayne, informed pt that was fine and to call back with any persistent or worsening symptoms. Voices understanding.   "

## 2020-09-28 NOTE — TELEPHONE ENCOUNTER
"Pt calls back with c/o of gas and diarrhea over the weekend and is \"worried about my baby\". After speaking with Dr. Juarez, informed pt she can take imodium and/or gas-x and can f/u with her GI for further treatment with the ulcerative colitis. Reassured pt the baby is not affected by GI symptoms such as this. Insructed pt to call back with any persistent or worsening symptoms and to watch for vaginal bleeding and/or abdominal cramping. Pt voices understanding.   "

## 2020-09-29 ENCOUNTER — NURSE TRIAGE (OUTPATIENT)
Dept: CALL CENTER | Facility: HOSPITAL | Age: 30
End: 2020-09-29

## 2020-09-29 ENCOUNTER — HOSPITAL ENCOUNTER (EMERGENCY)
Facility: HOSPITAL | Age: 30
Discharge: HOME | End: 2020-09-29
Attending: EMERGENCY MEDICINE | Admitting: EMERGENCY MEDICINE

## 2020-09-29 VITALS
DIASTOLIC BLOOD PRESSURE: 84 MMHG | HEART RATE: 90 BPM | RESPIRATION RATE: 18 BRPM | WEIGHT: 136 LBS | TEMPERATURE: 98.2 F | BODY MASS INDEX: 23.22 KG/M2 | OXYGEN SATURATION: 99 % | SYSTOLIC BLOOD PRESSURE: 116 MMHG | HEIGHT: 64 IN

## 2020-09-29 DIAGNOSIS — E86.9 VOLUME DEPLETION: Primary | ICD-10-CM

## 2020-09-29 DIAGNOSIS — Z3A.11 11 WEEKS GESTATION OF PREGNANCY: ICD-10-CM

## 2020-09-29 DIAGNOSIS — E87.6 HYPOKALEMIA: ICD-10-CM

## 2020-09-29 DIAGNOSIS — R19.7 DIARRHEA, UNSPECIFIED TYPE: ICD-10-CM

## 2020-09-29 DIAGNOSIS — N39.0 ACUTE UTI: ICD-10-CM

## 2020-09-29 LAB
ALBUMIN SERPL-MCNC: 4.3 G/DL (ref 3.5–5.2)
ALBUMIN/GLOB SERPL: 1.3 G/DL
ALP SERPL-CCNC: 98 U/L (ref 39–117)
ALT SERPL W P-5'-P-CCNC: 53 U/L (ref 1–33)
ANION GAP SERPL CALCULATED.3IONS-SCNC: 12 MMOL/L (ref 5–15)
AST SERPL-CCNC: 40 U/L (ref 1–32)
BACTERIA UR QL AUTO: ABNORMAL /HPF
BASOPHILS # BLD AUTO: 0.04 10*3/MM3 (ref 0–0.2)
BASOPHILS NFR BLD AUTO: 0.4 % (ref 0–1.5)
BILIRUB SERPL-MCNC: 0.2 MG/DL (ref 0–1.2)
BILIRUB UR QL STRIP: ABNORMAL
BUN SERPL-MCNC: 7 MG/DL (ref 6–20)
BUN/CREAT SERPL: 11.9 (ref 7–25)
CALCIUM SPEC-SCNC: 9.6 MG/DL (ref 8.6–10.5)
CHLORIDE SERPL-SCNC: 99 MMOL/L (ref 98–107)
CLARITY UR: ABNORMAL
CO2 SERPL-SCNC: 25 MMOL/L (ref 22–29)
COLOR UR: ABNORMAL
CREAT SERPL-MCNC: 0.59 MG/DL (ref 0.57–1)
DEPRECATED RDW RBC AUTO: 45.7 FL (ref 37–54)
EOSINOPHIL # BLD AUTO: 0.04 10*3/MM3 (ref 0–0.4)
EOSINOPHIL NFR BLD AUTO: 0.4 % (ref 0.3–6.2)
ERYTHROCYTE [DISTWIDTH] IN BLOOD BY AUTOMATED COUNT: 16.7 % (ref 12.3–15.4)
GFR SERPL CREATININE-BSD FRML MDRD: 118 ML/MIN/1.73
GLOBULIN UR ELPH-MCNC: 3.4 GM/DL
GLUCOSE SERPL-MCNC: 109 MG/DL (ref 65–99)
GLUCOSE UR STRIP-MCNC: NEGATIVE MG/DL
HCT VFR BLD AUTO: 35.2 % (ref 34–46.6)
HGB BLD-MCNC: 11.5 G/DL (ref 12–15.9)
HGB UR QL STRIP.AUTO: NEGATIVE
HYALINE CASTS UR QL AUTO: ABNORMAL /LPF
IMM GRANULOCYTES # BLD AUTO: 0.04 10*3/MM3 (ref 0–0.05)
IMM GRANULOCYTES NFR BLD AUTO: 0.4 % (ref 0–0.5)
KETONES UR QL STRIP: ABNORMAL
LEUKOCYTE ESTERASE UR QL STRIP.AUTO: ABNORMAL
LYMPHOCYTES # BLD AUTO: 2.12 10*3/MM3 (ref 0.7–3.1)
LYMPHOCYTES NFR BLD AUTO: 21.3 % (ref 19.6–45.3)
MCH RBC QN AUTO: 24.8 PG (ref 26.6–33)
MCHC RBC AUTO-ENTMCNC: 32.7 G/DL (ref 31.5–35.7)
MCV RBC AUTO: 75.9 FL (ref 79–97)
MONOCYTES # BLD AUTO: 1 10*3/MM3 (ref 0.1–0.9)
MONOCYTES NFR BLD AUTO: 10.1 % (ref 5–12)
MUCOUS THREADS URNS QL MICRO: ABNORMAL /HPF
NEUTROPHILS NFR BLD AUTO: 6.71 10*3/MM3 (ref 1.7–7)
NEUTROPHILS NFR BLD AUTO: 67.4 % (ref 42.7–76)
NITRITE UR QL STRIP: NEGATIVE
NRBC BLD AUTO-RTO: 0 /100 WBC (ref 0–0.2)
PH UR STRIP.AUTO: 6 [PH] (ref 5–8)
PLATELET # BLD AUTO: 527 10*3/MM3 (ref 140–450)
PMV BLD AUTO: 9.2 FL (ref 6–12)
POTASSIUM SERPL-SCNC: 3.1 MMOL/L (ref 3.5–5.2)
PROT SERPL-MCNC: 7.7 G/DL (ref 6–8.5)
PROT UR QL STRIP: ABNORMAL
RBC # BLD AUTO: 4.64 10*6/MM3 (ref 3.77–5.28)
RBC # UR: ABNORMAL /HPF
REF LAB TEST METHOD: ABNORMAL
SODIUM SERPL-SCNC: 136 MMOL/L (ref 136–145)
SP GR UR STRIP: >1.03 (ref 1–1.03)
SQUAMOUS #/AREA URNS HPF: ABNORMAL /HPF
UROBILINOGEN UR QL STRIP: ABNORMAL
WBC # BLD AUTO: 9.95 10*3/MM3 (ref 3.4–10.8)
WBC UR QL AUTO: ABNORMAL /HPF
YEAST URNS QL MICRO: ABNORMAL /HPF

## 2020-09-29 PROCEDURE — 25010000003 POTASSIUM CHLORIDE 10 MEQ/100ML SOLUTION: Performed by: EMERGENCY MEDICINE

## 2020-09-29 PROCEDURE — 25010000002 CEFTRIAXONE PER 250 MG: Performed by: EMERGENCY MEDICINE

## 2020-09-29 PROCEDURE — 96368 THER/DIAG CONCURRENT INF: CPT

## 2020-09-29 PROCEDURE — 80053 COMPREHEN METABOLIC PANEL: CPT | Performed by: EMERGENCY MEDICINE

## 2020-09-29 PROCEDURE — 85025 COMPLETE CBC W/AUTO DIFF WBC: CPT | Performed by: EMERGENCY MEDICINE

## 2020-09-29 PROCEDURE — 99283 EMERGENCY DEPT VISIT LOW MDM: CPT

## 2020-09-29 PROCEDURE — 96365 THER/PROPH/DIAG IV INF INIT: CPT

## 2020-09-29 PROCEDURE — 81001 URINALYSIS AUTO W/SCOPE: CPT | Performed by: EMERGENCY MEDICINE

## 2020-09-29 RX ORDER — SODIUM CHLORIDE 0.9 % (FLUSH) 0.9 %
10 SYRINGE (ML) INJECTION AS NEEDED
Status: DISCONTINUED | OUTPATIENT
Start: 2020-09-29 | End: 2020-09-30 | Stop reason: HOSPADM

## 2020-09-29 RX ORDER — CEPHALEXIN 500 MG/1
500 CAPSULE ORAL 3 TIMES DAILY
Qty: 21 CAPSULE | Refills: 0 | Status: SHIPPED | OUTPATIENT
Start: 2020-09-29 | End: 2020-10-26

## 2020-09-29 RX ORDER — POTASSIUM CHLORIDE 7.45 MG/ML
10 INJECTION INTRAVENOUS ONCE
Status: COMPLETED | OUTPATIENT
Start: 2020-09-29 | End: 2020-09-29

## 2020-09-29 RX ADMIN — SODIUM CHLORIDE 1000 ML: 9 INJECTION, SOLUTION INTRAVENOUS at 20:18

## 2020-09-29 RX ADMIN — CEFTRIAXONE SODIUM 1 G: 1 INJECTION, POWDER, FOR SOLUTION INTRAMUSCULAR; INTRAVENOUS at 20:18

## 2020-09-29 RX ADMIN — SODIUM CHLORIDE 1000 ML: 9 INJECTION, SOLUTION INTRAVENOUS at 19:08

## 2020-09-29 RX ADMIN — POTASSIUM CHLORIDE 10 MEQ: 7.46 INJECTION, SOLUTION INTRAVENOUS at 20:18

## 2020-09-29 NOTE — TELEPHONE ENCOUNTER
"Caller is 11 weeks pregnant.  She started having diarrhea and vomiting over the weekend.  She called and talked to her OBGYN as she was also having lower abd pain.  He thought it was due to dehydration and recommended increasing water intake which she has.  She is still nauseated and the diarrhea has improved.  She states that she isn't really having abd pain but it is uncomfortable.  No S/S of dehydration.  Recommended calling her OBGYN for recommendations.  Reason for Disposition  • [1] Constant abdominal pain AND [2] present > 2 hours    Additional Information  • Negative: Passed out (i.e., lost consciousness, collapsed and was not responding)  • Negative: Shock suspected (e.g., cold/pale/clammy skin, too weak to stand, low BP, rapid pulse)  • Negative: Difficult to awaken or acting confused (e.g., disoriented, slurred speech)  • Negative: Sounds like a life-threatening emergency to the triager  • Negative: Followed an abdomen (stomach) injury  • Negative: [1] Abdominal pain AND [2] pregnant > 20 weeks  • Negative: MODERATE-SEVERE abdominal pain (e.g., interferes with normal activities, awakens from sleep)  • Negative: [1] SEVERE vaginal bleeding (e.g., soaking 2 pads per hour, large blood clots) AND [2] present 2 or more hours  • Negative: [1] MODERATE vaginal bleeding (i.e., soaking 1 pad / hour; clots) AND [2] present > 6 hours  • Negative: [1] MODERATE vaginal bleeding (i.e., soaking 1 pad / hour; clots) AND [2] pregnant > 12 weeks  • Negative: Passed tissue (e.g., gray-white)  • Negative: [1] Vomiting AND [2] contains red blood or black (\"coffee ground\") material  (Exception: few red streaks in vomit that only happened once)  • Negative: Shoulder pain  • Negative: Lightheadedness or dizziness (e.g., feels like passing out)  • Negative: Patient sounds very sick or weak to the triager  • Negative: Blood in urine (red, pink, or tea-colored)  • Negative: Fever > 100.4 F (38.0 C)  • Negative: White of the eyes " "have turned yellow (i.e., jaundice)  • Negative: [1] Intermittent lower abdominal pain (e.g., cramping) AND [2] present > 24 hours    Answer Assessment - Initial Assessment Questions  1. LOCATION: \"Where does it hurt?\"       Lower abd but has improved just uncomfortable now  2. RADIATION: \"Does the pain shoot anywhere else?\" (e.g., chest, back, shoulder)      no  3. ONSET: \"When did the pain begin?\" (e.g., minutes, hours or days ago)       This weekend  4. ONSET: \"Gradual or sudden onset?\"      Gradual when diarrhea started  5. PATTERN \"Does the pain come and go, or has it been constant since it started?\"       Just uncomfortable now  6. SEVERITY: \"How bad is the pain?\" \"What does it keep you from doing?\"  (e.g., Scale 1-10; mild, moderate, or severe)    - MILD (1-3): doesn't interfere with normal activities, abdomen soft and not tender to touch     - MODERATE (4-7): interferes with normal activities or awakens from sleep, tender to touch     - SEVERE (8-10): excruciating pain, doubled over, unable to do any normal activities      mild  7. RECURRENT SYMPTOM: \"Have you ever had this type of abdominal pain before?\" If so, ask: \"When was the last time?\" and \"What happened that time?\"       no  8. CAUSE: \"What do you think is causing the abdominal pain?\"      Dr. Juarez thought I was a little dehydrated when I spoke with him over the weekend from the diarrhea and N/V  9. RELIEVING/AGGRAVATING FACTORS: \"What makes it better or worse?\" (e.g., antacids, bowel movement, movement)      na  10. OTHER SYMPTOMS: \"Has there been any vaginal bleeding, fever, vomiting, diarrhea, or urine problems?\"        Diarrhea it has improved but still have it, nausea  11. MANUEL: \"What date are you expecting to deliver?\"        March    Protocols used: PREGNANCY - ABDOMINAL PAIN LESS THAN 20 WEEKS EGA-ADULT-AH    "

## 2020-09-30 ENCOUNTER — TELEPHONE (OUTPATIENT)
Dept: GASTROENTEROLOGY | Facility: CLINIC | Age: 30
End: 2020-09-30

## 2020-09-30 NOTE — TELEPHONE ENCOUNTER
OB at 11 & 5 calls stating her diarrhea got so bad she ended up calling office last night and spoke with Dr. Alvarez. Dr. Alvarez instructed pt to be seen in ER for dehydration and need for IV fluids. Pt states once she got there she found out she had a UTI and Keflex was called in for her to begin taking today. Pt is reluctant to take rx because of possibly worsening diarrhea. After speaking with Dr. Soliz, again advised pt to f/u with her GI doctor. Informed pt her UTI did not appear to be bad enough it needed immediate treatment and that pt would have time to call GI today to set up apt and try to get diarrhea under control before beginning Keflex. Instructed pt to call back with any cramping, leaking of fluid, or vaginal bleeding. Pt voices understanding.

## 2020-09-30 NOTE — TELEPHONE ENCOUNTER
Pt calls back stating she spoke with GI and they made her an apt for 10/16/2020 but pt had asked them what to do about diarrhea in the meantime because pt is already eating a bland diet and taking imodium. Pt states GI told her there was nothing else she could do and that her UC should get better as her pregnancy went along. Pt is asking if our office has any advise on the diarrhea. Discussed with pt we have given her all the advise and options we can from our standpoint and this type of advise would have to come from GI. Encouraged pt to reach back out to GI with any further questions or concerns regarding diarrhea. Pt voices understanding.

## 2020-10-01 ENCOUNTER — TELEPHONE (OUTPATIENT)
Dept: GASTROENTEROLOGY | Facility: CLINIC | Age: 30
End: 2020-10-01

## 2020-10-01 NOTE — TELEPHONE ENCOUNTER
"Pt left VM for Rajat stating that she is still in a \"flare\"-she spoke to Nerissa yesterday and has an appt with Sharon 10/16/2020. She states on the message that she is \"really struggling\" and has been in this flare for about a week-she was asking for suggestions/medications that she could take (she is 12 weeks pregnant) to help until she can be seen by Sharon.     I tried to call her back to discuss-was unable to reach her so I left detailed VM-advised if she felt bad enough/symptoms got worse then she would need to go to ER/Urgent Care for evaluation. I asked her to call me back today/tomorrow so we can discuss further and I can message Dr. Mcdaniel at that time.   "

## 2020-10-02 NOTE — TELEPHONE ENCOUNTER
"Pt called me back this morning and I spoke to her about her flare.     She tells me she is still having pain in her stomach-about midway to her pelvic area. It is very uncomfortable to sleep. Pt does state that last week when her flare started the pain was very sharp and now is more of a dull ache but it is constant. She is having lower back pain but does have a UTI. Pt also states she has diarrhea every time she has a BM.     She has been eating bland foods. Her OB/GYN recommended she try OTC Imodium and Gas-X but they haven't helped her symptoms. She hasn't had any medications for these symptoms since Thursday. She had some nausea/vomiting at the beginning of the flare but does report that is better-she tells me she had some episodes of \"dry heaving\" on Wed but none since. No fever or BRBPR.     Pt has scheduled an appt with Sharon but that isn't until 10/16/2020. She is asking what she can do until then to get some relief? I told her I would message Dr. Mcdaniel and call her back with any recommendations he had.   "

## 2020-10-02 NOTE — TELEPHONE ENCOUNTER
If she feels that she is in bad shape and is struggling then she really needs to go to the ER or urgent care for immediate evaluation today.  She especially should go if she is having nausea and vomiting and cannot keep anything down.  Of note, nausea and vomiting is not a typical associated symptom of a flare of ulcerative colitis so if she is struggling with nausea and vomiting then there may be something else is going on accounting for that;  such as nausea vomiting with pregnancy, infection etc.  Again, if she is struggling she needs to go today.    In addition, you can add her onto my schedule the next time I am in the office which is Monday, October 5.  Create a spot for her 30 minutes after my last appointment for the day

## 2020-10-02 NOTE — TELEPHONE ENCOUNTER
Called and spoke to pt re: Dr. Mcdaniel's recommendations. I did advised ER/Urgent Care if she felt worse between now and Monday, otherwise she will be at appt with Dr. Mcdaniel Mon 10/5/2020 @ 3:45pm.

## 2020-10-05 ENCOUNTER — OFFICE VISIT (OUTPATIENT)
Dept: GASTROENTEROLOGY | Facility: CLINIC | Age: 30
End: 2020-10-05

## 2020-10-05 VITALS
BODY MASS INDEX: 23.56 KG/M2 | TEMPERATURE: 98.4 F | HEIGHT: 64 IN | OXYGEN SATURATION: 99 % | HEART RATE: 82 BPM | WEIGHT: 138 LBS | DIASTOLIC BLOOD PRESSURE: 80 MMHG | SYSTOLIC BLOOD PRESSURE: 114 MMHG

## 2020-10-05 DIAGNOSIS — R19.7 DIARRHEA, UNSPECIFIED TYPE: ICD-10-CM

## 2020-10-05 DIAGNOSIS — K51.00 ULCERATIVE PANCOLITIS WITHOUT COMPLICATION (HCC): Primary | ICD-10-CM

## 2020-10-05 DIAGNOSIS — R11.0 NAUSEA: ICD-10-CM

## 2020-10-05 PROCEDURE — 99214 OFFICE O/P EST MOD 30 MIN: CPT | Performed by: INTERNAL MEDICINE

## 2020-10-05 RX ORDER — FOLIC ACID 1 MG/1
1 TABLET ORAL DAILY
COMMUNITY

## 2020-10-05 RX ORDER — PRENATAL VIT/IRON FUM/FOLIC AC 27MG-0.8MG
TABLET ORAL DAILY
COMMUNITY
End: 2021-06-01

## 2020-10-05 RX ORDER — UBIDECARENONE 75 MG
50 CAPSULE ORAL DAILY
COMMUNITY

## 2020-10-05 RX ORDER — METHYLPREDNISOLONE 4 MG/1
TABLET ORAL
Qty: 1 EACH | Refills: 0 | Status: SHIPPED | OUTPATIENT
Start: 2020-10-05 | End: 2020-10-12

## 2020-10-05 RX ORDER — MESALAMINE 0.38 G/1
375 CAPSULE, EXTENDED RELEASE ORAL DAILY
Qty: 120 CAPSULE | Refills: 11 | Status: SHIPPED | OUTPATIENT
Start: 2020-10-05 | End: 2021-10-05

## 2020-10-05 NOTE — PROGRESS NOTES
[facility]    Chief Complaint   Patient presents with   • Ulcerative Colitis     has been having diarrhea throws up sometimes nausea       Subjective     HPI    Maya Fields is a 30 y.o. female who presents with a chief complaint of diarrhea.    She has not been here since February 2018.  At that time she was on Apriso on Imuran.  She states shortly afterward she cut out the Imuran and probably continue to Apriso for a year.  But she does not think she has been on it for at least a year.  She states she was doing great.  She was having 1 formed bowel movement a day.  No blood or mucus.  She was not having troubles.  She states in July she ate had a local restaurant, [employer], she states she had some diarrhea afterwards.  She felt kind of crappy.  She did go see primary.  She was tested for Covid and was negative.  She did test positive for strep and took antibiotics.    That was short-lived the diarrhea.  She states she was back to her once a day formed bowel movement.  Then Thursday, 11 days ago she went [employer] again, the next day she started abdominal cramps and diarrhea.  She has had some nausea.  Over that first week and her symptoms started getting worse.  She denied any fevers.  She was having some nausea.  Occasion she would vomit.  She went to the ER in September 29.  She had a borderline UTI and they gave her Keflex to take.  She states she had some UTI symptoms but those subsided and she never took the Keflex.  She was given IV fluids.  She contacted us 3 days ago.  We advised her if she was struggling to go back to the ER and we also offered an appointment first available which is now.    She states she still has some nausea.  She is not vomiting.  She never had any fever chills or sweats.  Never had any bloody mucus in her stools.  She is having frequent watery diarrhea.  Usually occurs more in the mornings.  Can occur postprandial.  Is not nocturnal.  It is only been  occurring for the last 10 days.  She was having more cramps last week than what she is today and yesterday.  No one else she knows is sick.  She has been anxious and jittery over the last 10 days.  This is started since she got the diarrhea.  She is typically not an anxious individual.  Everything else is going well.    Of note she is pregnant, 11 weeks gestation.  She states she had a blood test and she is going to have a boy.  This is her first child.  She and her boyfriend are excited.      ================ February 2018 HPI= 9==============================  HISTORY OF PRESENT ILLNESS     Maya Fields is a 27 y.o. female presents  with ulcerative colitis.  She is currently taking Apriso 4 pills daily and Imuran 100 mg daily.  She is having 2 bowel movements a day.  She was out of the Apriso for about a month.  No rectal bleeding.  No abdominal pain.  No nausea vomiting.  No fevers or chills.  No unexplained weight loss.  No skin rashes.  No joint pain.  No ocular pain.  Last colonoscopy was October 2016 noting pancolitis and a colon polyp, recommended recall was 6-12 months to check healing.  She has not had a chest x-ray or T spot within the last year.  She does need a refill of Apriso.  We will get her scheduled for a colonoscopy.              Ov  7/2017 with dr baker :Maya Fields is a 26 y.o. female who presents with a chief complaint of  Ulcerative colitis.  She is doing well.  She has been on Imuran since her colonoscopy approximately last October which showed pancolitis.  She states she is having one bowel movement a day.  If she gets a little anxious she might have to and around her menses she might have 2.  They are formed.  There is no mucus or blood.  Denies abdominal pain.  She has had no infectious symptoms.  Everything is going well.     Past Medical History:   Diagnosis Date   • Anemia    • GERD (gastroesophageal reflux disease)    • Nephrolithiasis    • UC (ulcerative colitis) (CMS/[facility])      Diagnosis 2011, pancolitis       Past Surgical History:   Procedure Laterality Date   • CHOLECYSTECTOMY     • COLONOSCOPY     • COLONOSCOPY Left 10/17/2016    Procedure: COLONOSCOPY WITH ANESTHESIA;  Surgeon: Jordan Mcdaniel MD;  Location: [facility] ENDOSCOPY;  Service:          Current Outpatient Medications:   •  Cholecalciferol (vitamin D3) 125 MCG (5000 UT) capsule capsule, Take 5,000 Units by mouth Daily., Disp: , Rfl:   •  folic acid (FOLVITE) 1 MG tablet, Take 1 mg by mouth Daily., Disp: , Rfl:   •  lansoprazole (PREVACID) 15 MG capsule, Take 15 mg by mouth., Disp: , Rfl:   •  magnesium oxide (MAGOX) 400 (241.3 Mg) MG tablet tablet, Take 400 mg by mouth Daily., Disp: , Rfl:   •  Prenatal Vit-Fe Fumarate-FA (prenatal vitamin 27-0.8) 27-0.8 MG tablet tablet, Take  by mouth Daily., Disp: , Rfl:   •  Vitamin A 3 MG (11077 UT) capsule, Take 10,000 Units by mouth Daily., Disp: , Rfl:   •  vitamin B-12 (CYANOCOBALAMIN) 100 MCG tablet, Take 50 mcg by mouth Daily., Disp: , Rfl:   •  cephalexin (KEFLEX) 500 MG capsule, Take 1 capsule by mouth 3 (Three) Times a Day., Disp: 21 capsule, Rfl: 0  •  mesalamine (APRISO) 0.375 g 24 hr capsule, Take 1 capsule by mouth Daily., Disp: 120 capsule, Rfl: 11  •  methylPREDNISolone (MEDROL) 4 MG dose pack, Take as directed on package instructions., Disp: 1 each, Rfl: 0    Allergies   Allergen Reactions   • Zofran [Ondansetron Hcl] Palpitations       Social History     Socioeconomic History   • Marital status: Single     Spouse name: Not on file   • Number of children: Not on file   • Years of education: Not on file   • Highest education level: Not on file   Tobacco Use   • Smoking status: Former Smoker   • Smokeless tobacco: Never Used   Substance and Sexual Activity   • Alcohol use: No   • Drug use: No   • Sexual activity: Yes     Partners: Male     Birth control/protection: None       Family History   Problem Relation Age of Onset   • Crohn's disease Cousin    • Colon cancer  Maternal Grandfather    • Esophageal cancer Neg Hx        Review of Systems  General no fever chills or sweats weight stable  Gastrointestinal: Not present-abdominal pain, constipation, dysphagia, hematemesis, melena, odynophagia, pyrosis, regurgitation, hematochezia,    Objective     Vitals:    10/05/20 1547   BP: 114/80   Pulse: 82   Temp: 98.4 °F (36.9 °C)   SpO2: 99%       Physical Exam  No acute distress. Vital signs as documented. Skin warm and dry and without overt rashes. EOMI, sclera anicteric.  Neck without JVD or masses. Lungs clear to auscultation bilaterally, no rales. Heart exam notable for regular rhythm, normal sounds and absence of loud murmurs, rubs or gallops. Abdomen is soft, nontender, non distended, normal bowel sounds and without evidence of organomegaly, masses, or abdominal aortic enlargement. Extremities nonedematous, no cyanosis. Neuro alert, moves extremities.        Assessment/Plan   Problem List Items Addressed This Visit        Digestive    UC (ulcerative colitis) (CMS/[facility]) - Primary    Overview     Colonoscopy 2010 by Dr. Coyle revealed pancolitis.  Colonoscopy by me October 2016 revealed active colitis to the ascending colon.  Imuran 100 mg and Apriso 4 tablets daily initiated shortly thereafter.  Patient continue this to February 2018 then did not follow-up any further.         Relevant Orders    Gastrointestinal Panel, PCR - Stool, Per Rectum    Clostridium Difficile Toxin, PCR - Stool, Per Rectum    Nausea    Diarrhea            She had rather abrupt onset of symptoms.  As we discussed, this is not a typical presentation for a flare of ulcerative colitis.  Ulcerative colitis usually is not associated of nausea vomiting as well.,  In the past she has had significant colitis on colonoscopy but not much symptoms.  I asked her she was downplaying her symptoms and she replies no.  She really was not having symptoms prior to 10 days ago.    I suspect that this could be  multifactorial.  She does have underlying inflammatory bowel disease, she may develop the food intolerance since she has had the same symptoms occur after eating at the same restaurant now twice.  May be as an infectious etiology, she is more anxious and that can be contributing but I do not believe it is the sole reason.  She is also pregnant which could be accounting for the nausea.  We talked about all of this and more.    She is not having alarm symptoms so I do not recommend colonoscopy investigation as I believe the risk of this would outweigh any benefits.  I recommend supportive treatment with plenty of hydration and good nutrition.  I recommend stool studies to evaluate for infectious etiology and she agrees to get these done.  I did review her labs that she recently had at the ER.  Regarding her possible UTI will defer that to her primary care and the ER physicians.      With her underlying inflammatory bowel disease I do believe she should get back on mesalamine.  Also, I suggested a Medrol Dosepak to try to get her diarrhea under control sooner rather than later.  We discussed the side effects of these medications in detail and the fact that she is pregnant and the risk to the baby.  These meds have been used frequently over many decades and pregnant women with a good track record.  Nothing is without risk but as discussed the risk of harm to the patient and the baby in an individual with active ulcerative colitis far exceeds the risk of the medications.  I did ask her to contact her GYN physician prior to starting these 2 meds just to make sure they do not have any reason I am not aware of to not take these.  She agreed to do so.  She expressed understanding of the risk and benefits of the meds.    We also discussed that IBD can be self-limited during pregnancy.  As pregnancy progresses inflammatory bowel disease tends to improve.  This is related to the hormone production and its benefits for IBD.  We  discussed though that patients with underlying IBD can have a flare postpartum, typically 2 to 3 months postpartum.  Thus I would recommend she start the Apriso and stay on the Apriso to help prevent flares of her disease.  She expressed understanding.    I asked her to contact us if she has any increasing symptoms or alarm symptoms as we discussed.  Otherwise I will have her come back and see us in the office in 4 weeks.    Continue ongoing management by primary care provider and other specialists.     Patient's Body mass index is 23.69 kg/m². BMI is within normal parameters. No follow-up required..        EMR Dragon/transcription disclaimer:  Much of this encounter note is electronic transcription/translation of spoken language to printed text.  The electronic translation of spoken language may be erroneous, or at times, nonsensical words or phrases may be inadvertently transcribed.  Although I have reviewed the note for such errors, some may still exist.    Jordan Mcdaniel MD  16:46 CDT  10/05/20

## 2020-10-06 ENCOUNTER — TELEPHONE (OUTPATIENT)
Dept: OBSTETRICS AND GYNECOLOGY | Facility: CLINIC | Age: 30
End: 2020-10-06

## 2020-10-06 NOTE — TELEPHONE ENCOUNTER
OB at 12 & 4 states her GI prescribed Medrol dose pack and is asking if that is okay to take in pregnancy. After speaking with Dr. Juarez, informed pt that was fine.

## 2020-10-12 ENCOUNTER — ROUTINE PRENATAL (OUTPATIENT)
Dept: OBSTETRICS AND GYNECOLOGY | Facility: CLINIC | Age: 30
End: 2020-10-12

## 2020-10-12 VITALS — WEIGHT: 132 LBS | SYSTOLIC BLOOD PRESSURE: 112 MMHG | BODY MASS INDEX: 22.66 KG/M2 | DIASTOLIC BLOOD PRESSURE: 82 MMHG

## 2020-10-12 DIAGNOSIS — Z34.82 ENCOUNTER FOR SUPERVISION OF OTHER NORMAL PREGNANCY IN SECOND TRIMESTER: Primary | ICD-10-CM

## 2020-10-12 LAB
GLUCOSE UR STRIP-MCNC: NEGATIVE MG/DL
PROT UR STRIP-MCNC: NEGATIVE MG/DL

## 2020-10-12 PROCEDURE — 0502F SUBSEQUENT PRENATAL CARE: CPT | Performed by: OBSTETRICS & GYNECOLOGY

## 2020-10-12 RX ORDER — METOCLOPRAMIDE 10 MG/1
10 TABLET ORAL 3 TIMES DAILY PRN
Qty: 30 TABLET | Refills: 2 | Status: SHIPPED | OUTPATIENT
Start: 2020-10-12 | End: 2021-06-01

## 2020-10-12 NOTE — PROGRESS NOTES
Feeling well, no nausea  Has lost some weight since last visit but keeping most food down  Still has intermittent nausea  Had flare of UC after last visit, GI gave steroids  Reviewed normal prenatal labs  Rx Reglan for nausea  ffDNA and maternal carrier screening today

## 2020-10-26 ENCOUNTER — TELEPHONE (OUTPATIENT)
Dept: OBSTETRICS AND GYNECOLOGY | Facility: CLINIC | Age: 30
End: 2020-10-26

## 2020-10-26 PROCEDURE — 87635 SARS-COV-2 COVID-19 AMP PRB: CPT | Performed by: NURSE PRACTITIONER

## 2020-10-26 NOTE — TELEPHONE ENCOUNTER
OB at 15.3 calls with c/o sinus congestion and cough. Pt has already been Covid tested and results are pending. Informed pt she can take Sudafed for congestion, chloraseptic spray, lozenges, Robitussin plain or DM, Claritin, Zyrtec, or Tylenol Sinus. Voices understanding.

## 2020-11-09 ENCOUNTER — ROUTINE PRENATAL (OUTPATIENT)
Dept: OBSTETRICS AND GYNECOLOGY | Facility: CLINIC | Age: 30
End: 2020-11-09

## 2020-11-09 VITALS — SYSTOLIC BLOOD PRESSURE: 150 MMHG | WEIGHT: 147 LBS | DIASTOLIC BLOOD PRESSURE: 82 MMHG | BODY MASS INDEX: 25.23 KG/M2

## 2020-11-09 DIAGNOSIS — Z34.82 ENCOUNTER FOR SUPERVISION OF OTHER NORMAL PREGNANCY IN SECOND TRIMESTER: Primary | ICD-10-CM

## 2020-11-09 LAB
GLUCOSE UR STRIP-MCNC: NEGATIVE MG/DL
PROT UR STRIP-MCNC: NEGATIVE MG/DL

## 2020-11-09 PROCEDURE — 0502F SUBSEQUENT PRENATAL CARE: CPT | Performed by: OBSTETRICS & GYNECOLOGY

## 2020-11-09 NOTE — PROGRESS NOTES
Starting to feel fetal movement  Feeling well, tested COVID + 10/26  Normal ffDNA, BOY  Carrier screening positive for SLO, will screen partner  Schedule anatomy  12/1

## 2020-12-08 ENCOUNTER — ROUTINE PRENATAL (OUTPATIENT)
Dept: OBSTETRICS AND GYNECOLOGY | Facility: CLINIC | Age: 30
End: 2020-12-08

## 2020-12-08 VITALS — SYSTOLIC BLOOD PRESSURE: 142 MMHG | BODY MASS INDEX: 26.78 KG/M2 | WEIGHT: 156 LBS | DIASTOLIC BLOOD PRESSURE: 72 MMHG

## 2020-12-08 DIAGNOSIS — Z34.82 ENCOUNTER FOR SUPERVISION OF OTHER NORMAL PREGNANCY IN SECOND TRIMESTER: Primary | ICD-10-CM

## 2020-12-08 LAB
GLUCOSE UR STRIP-MCNC: NEGATIVE MG/DL
PROT UR STRIP-MCNC: NEGATIVE MG/DL

## 2020-12-08 PROCEDURE — 0502F SUBSEQUENT PRENATAL CARE: CPT | Performed by: OBSTETRICS & GYNECOLOGY

## 2020-12-08 NOTE — PROGRESS NOTES
Good fetal movement  Reviewed normal anatomy US  BP noted but taken through shirt sleeve  Partner carrier screen normal  Glucola and Hgb next visit  Discussed possible dizzy spells and ensuring adequate hydration

## 2021-01-11 ENCOUNTER — ROUTINE PRENATAL (OUTPATIENT)
Dept: OBSTETRICS AND GYNECOLOGY | Facility: CLINIC | Age: 31
End: 2021-01-11

## 2021-01-11 VITALS — DIASTOLIC BLOOD PRESSURE: 78 MMHG | BODY MASS INDEX: 28.32 KG/M2 | WEIGHT: 165 LBS | SYSTOLIC BLOOD PRESSURE: 130 MMHG

## 2021-01-11 DIAGNOSIS — Z34.83 ENCOUNTER FOR SUPERVISION OF OTHER NORMAL PREGNANCY IN THIRD TRIMESTER: Primary | ICD-10-CM

## 2021-01-11 LAB
GLUCOSE UR STRIP-MCNC: NEGATIVE MG/DL
PROT UR STRIP-MCNC: NEGATIVE MG/DL

## 2021-01-11 PROCEDURE — 0502F SUBSEQUENT PRENATAL CARE: CPT | Performed by: OBSTETRICS & GYNECOLOGY

## 2021-01-11 NOTE — PATIENT INSTRUCTIONS
Jose Padilla Contractions  Contractions of the uterus can occur throughout pregnancy, but they are not always a sign that you are in labor. You may have practice contractions called Noble Padilla contractions. These false labor contractions are sometimes confused with true labor.  What are Noble Padilla contractions?  Jose Padilla contractions are tightening movements that occur in the muscles of the uterus before labor. Unlike true labor contractions, these contractions do not result in opening (dilation) and thinning of the cervix. Toward the end of pregnancy (32-34 weeks), Noble Padilla contractions can happen more often and may become stronger. These contractions are sometimes difficult to tell apart from true labor because they can be very uncomfortable. You should not feel embarrassed if you go to the hospital with false labor.  Sometimes, the only way to tell if you are in true labor is for your health care provider to look for changes in the cervix. The health care provider will do a physical exam and may monitor your contractions. If you are not in true labor, the exam should show that your cervix is not dilating and your water has not broken.  If there are no other health problems associated with your pregnancy, it is completely safe for you to be sent home with false labor. You may continue to have Noble Padilla contractions until you go into true labor.  How to tell the difference between true labor and false labor  True labor  · Contractions last 30-70 seconds.  · Contractions become very regular.  · Discomfort is usually felt in the top of the uterus, and it spreads to the lower abdomen and low back.  · Contractions do not go away with walking.  · Contractions usually become more intense and increase in frequency.  · The cervix dilates and gets thinner.  False labor  · Contractions are usually shorter and not as strong as true labor contractions.  · Contractions are usually irregular.  · Contractions  are often felt in the front of the lower abdomen and in the groin.  · Contractions may go away when you walk around or change positions while lying down.  · Contractions get weaker and are shorter-lasting as time goes on.  · The cervix usually does not dilate or become thin.  Follow these instructions at home:    · Take over-the-counter and prescription medicines only as told by your health care provider.  · Keep up with your usual exercises and follow other instructions from your health care provider.  · Eat and drink lightly if you think you are going into labor.  · If Pick[place] Padilla contractions are making you uncomfortable:  ? Change your position from lying down or resting to walking, or change from walking to resting.  ? Sit and rest in a tub of warm water.  ? Drink enough fluid to keep your urine pale yellow. Dehydration may cause these contractions.  ? Do slow and deep breathing several times an hour.  · Keep all follow-up prenatal visits as told by your health care provider. This is important.  Contact a health care provider if:  · You have a fever.  · You have continuous pain in your abdomen.  Get help right away if:  · Your contractions become stronger, more regular, and closer together.  · You have fluid leaking or gushing from your vagina.  · You pass blood-tinged mucus (bloody show).  · You have bleeding from your vagina.  · You have low back pain that you never had before.  · You feel your baby’s head pushing down and causing pelvic pressure.  · Your baby is not moving inside you as much as it used to.  Summary  · Contractions that occur before labor are called Jose Padilla contractions, false labor, or practice contractions.  · Pickens Padilla contractions are usually shorter, weaker, farther apart, and less regular than true labor contractions. True labor contractions usually become progressively stronger and regular, and they become more frequent.  · Manage discomfort from Pickens Padilla contractions  by changing position, resting in a warm bath, drinking plenty of water, or practicing deep breathing.  This information is not intended to replace advice given to you by your health care provider. Make sure you discuss any questions you have with your health care provider.  Document Revised: 11/30/2018 Document Reviewed: 05/03/2018  Elsevier Patient Education © 2020 [employer]

## 2021-01-12 LAB
GLUCOSE 1H P 50 G GLC PO SERPL-MCNC: 119 MG/DL (ref 65–139)
HGB BLD-MCNC: 8.2 G/DL (ref 12–15.9)

## 2021-01-25 ENCOUNTER — ROUTINE PRENATAL (OUTPATIENT)
Dept: OBSTETRICS AND GYNECOLOGY | Facility: CLINIC | Age: 31
End: 2021-01-25

## 2021-01-25 VITALS — DIASTOLIC BLOOD PRESSURE: 70 MMHG | SYSTOLIC BLOOD PRESSURE: 140 MMHG | WEIGHT: 167 LBS | BODY MASS INDEX: 28.67 KG/M2

## 2021-01-25 DIAGNOSIS — Z34.83 ENCOUNTER FOR SUPERVISION OF OTHER NORMAL PREGNANCY IN THIRD TRIMESTER: Primary | ICD-10-CM

## 2021-01-25 LAB
GLUCOSE UR STRIP-MCNC: NEGATIVE MG/DL
PROT UR STRIP-MCNC: NEGATIVE MG/DL

## 2021-01-25 PROCEDURE — 0502F SUBSEQUENT PRENATAL CARE: CPT | Performed by: OBSTETRICS & GYNECOLOGY

## 2021-01-25 NOTE — PROGRESS NOTES
Good fetal movement  Glucola normal  Iron once daily for anemia  Discussed [place] Padilla contractions    Diagnoses and all orders for this visit:    1. Encounter for supervision of other normal pregnancy in third trimester (Primary)

## 2021-02-03 ENCOUNTER — TELEPHONE (OUTPATIENT)
Dept: OBSTETRICS AND GYNECOLOGY | Facility: CLINIC | Age: 31
End: 2021-02-03

## 2021-02-08 ENCOUNTER — ROUTINE PRENATAL (OUTPATIENT)
Dept: OBSTETRICS AND GYNECOLOGY | Facility: CLINIC | Age: 31
End: 2021-02-08

## 2021-02-08 VITALS — SYSTOLIC BLOOD PRESSURE: 152 MMHG | WEIGHT: 169 LBS | DIASTOLIC BLOOD PRESSURE: 92 MMHG | BODY MASS INDEX: 29.01 KG/M2

## 2021-02-08 DIAGNOSIS — Z34.83 ENCOUNTER FOR SUPERVISION OF OTHER NORMAL PREGNANCY IN THIRD TRIMESTER: Primary | ICD-10-CM

## 2021-02-08 LAB
GLUCOSE UR STRIP-MCNC: NEGATIVE MG/DL
PROT UR STRIP-MCNC: NEGATIVE MG/DL

## 2021-02-08 PROCEDURE — 0502F SUBSEQUENT PRENATAL CARE: CPT | Performed by: OBSTETRICS & GYNECOLOGY

## 2021-02-08 RX ORDER — FERROUS SULFATE 325(65) MG
325 TABLET ORAL
COMMUNITY

## 2021-02-08 NOTE — PROGRESS NOTES
"Good fetal movement  No contractions, no reflux  Patient reports BP normal at home, has \"white coat\" HTN  Plans Tdap next visit  Patient to check BP at home and bring record next visit   labor precautions    Diagnoses and all orders for this visit:    1. Encounter for supervision of other normal pregnancy in third trimester (Primary)      "

## 2021-02-09 ENCOUNTER — TELEPHONE (OUTPATIENT)
Dept: OBSTETRICS AND GYNECOLOGY | Facility: CLINIC | Age: 31
End: 2021-02-09

## 2021-02-09 NOTE — TELEPHONE ENCOUNTER
Pt called and wanted to know if it was safe to take Amoxicillin. I informed her that was safe to take in pregnancy, pt voiced understanding.

## 2021-02-19 ENCOUNTER — TELEPHONE (OUTPATIENT)
Dept: OBSTETRICS AND GYNECOLOGY | Facility: CLINIC | Age: 31
End: 2021-02-19

## 2021-02-19 ENCOUNTER — ROUTINE PRENATAL (OUTPATIENT)
Dept: OBSTETRICS AND GYNECOLOGY | Facility: CLINIC | Age: 31
End: 2021-02-19

## 2021-02-19 VITALS — DIASTOLIC BLOOD PRESSURE: 88 MMHG | SYSTOLIC BLOOD PRESSURE: 140 MMHG | WEIGHT: 174 LBS | BODY MASS INDEX: 29.87 KG/M2

## 2021-02-19 DIAGNOSIS — N76.0 ACUTE VAGINITIS: Primary | ICD-10-CM

## 2021-02-19 LAB
GLUCOSE UR STRIP-MCNC: NEGATIVE MG/DL
PROT UR STRIP-MCNC: NEGATIVE MG/DL

## 2021-02-19 PROCEDURE — 0502F SUBSEQUENT PRENATAL CARE: CPT | Performed by: OBSTETRICS & GYNECOLOGY

## 2021-02-19 PROCEDURE — 87563 M. GENITALIUM AMP PROBE: CPT | Performed by: OBSTETRICS & GYNECOLOGY

## 2021-02-19 PROCEDURE — 87798 DETECT AGENT NOS DNA AMP: CPT | Performed by: OBSTETRICS & GYNECOLOGY

## 2021-02-19 PROCEDURE — 87512 GARDNER VAG DNA QUANT: CPT | Performed by: OBSTETRICS & GYNECOLOGY

## 2021-02-19 PROCEDURE — 87661 TRICHOMONAS VAGINALIS AMPLIF: CPT | Performed by: OBSTETRICS & GYNECOLOGY

## 2021-02-19 PROCEDURE — 87481 CANDIDA DNA AMP PROBE: CPT | Performed by: OBSTETRICS & GYNECOLOGY

## 2021-02-19 RX ORDER — FLUCONAZOLE 150 MG/1
150 TABLET ORAL DAILY
Qty: 1 TABLET | Refills: 0 | Status: SHIPPED | OUTPATIENT
Start: 2021-02-19 | End: 2021-02-25

## 2021-02-19 NOTE — TELEPHONE ENCOUNTER
Pt was in to see Dr Wayne today and was c/o dysuria. Dr Wayne just now asked me if I could order a urine culture for this patient and take it to the lab but her urine had already been disposed of by another staff member. I called Maya to see if she was still near the office so she could come leave another sample but she is not. Pt advised to go to walk in clinic for evaluation over the weekend if symptoms persisted. Voiced understanding.

## 2021-02-19 NOTE — PROGRESS NOTES
at 32 weeks IUP presents with complaints of vaginal discharge. She also reports dysuria. She reports it started yesterday morning. She denies any obstetrical complaints.   PE   SSE: white curdy vaginal discharge, cervix appears closed   A?P  at 32 weeks IUP   Sent diflucan   RTC in as scheduled   Will send cultures with results to follow

## 2021-02-23 ENCOUNTER — TELEPHONE (OUTPATIENT)
Dept: OBSTETRICS AND GYNECOLOGY | Facility: CLINIC | Age: 31
End: 2021-02-23

## 2021-02-23 NOTE — TELEPHONE ENCOUNTER
Pt called c/o swelling, states BP is 130/70. I informed her to elevate her feet above her heart, monitor her bp and to let us know if it is elevated or go to [facility] if we are closed. I also encouraged her to decrease her sodium intake and increase her water, pt voiced understanding.

## 2021-02-25 ENCOUNTER — ROUTINE PRENATAL (OUTPATIENT)
Dept: OBSTETRICS AND GYNECOLOGY | Facility: CLINIC | Age: 31
End: 2021-02-25

## 2021-02-25 VITALS — SYSTOLIC BLOOD PRESSURE: 142 MMHG | WEIGHT: 176 LBS | BODY MASS INDEX: 30.21 KG/M2 | DIASTOLIC BLOOD PRESSURE: 78 MMHG

## 2021-02-25 DIAGNOSIS — Z34.83 ENCOUNTER FOR SUPERVISION OF OTHER NORMAL PREGNANCY IN THIRD TRIMESTER: Primary | ICD-10-CM

## 2021-02-25 LAB
GLUCOSE UR STRIP-MCNC: NEGATIVE MG/DL
PROT UR STRIP-MCNC: NEGATIVE MG/DL

## 2021-02-25 PROCEDURE — 90471 IMMUNIZATION ADMIN: CPT | Performed by: OBSTETRICS & GYNECOLOGY

## 2021-02-25 PROCEDURE — 0502F SUBSEQUENT PRENATAL CARE: CPT | Performed by: OBSTETRICS & GYNECOLOGY

## 2021-02-25 PROCEDURE — 90715 TDAP VACCINE 7 YRS/> IM: CPT | Performed by: OBSTETRICS & GYNECOLOGY

## 2021-02-25 NOTE — PROGRESS NOTES
Good fetal movement, has had some intermittent swelling  Has been checking blood pressure at home, log reviewed, running 135/78  Tdap in office today    labor precautions    Diagnoses and all orders for this visit:    1. Encounter for supervision of other normal pregnancy in third trimester (Primary)    Other orders  -     Tdap Vaccine Greater Than or Equal To 6yo IM

## 2021-03-01 LAB
LAB AP CASE REPORT: NORMAL
Lab: NORMAL
TRICHOMONAS VAGINALIS PCR: NOT DETECTED

## 2021-03-08 ENCOUNTER — ROUTINE PRENATAL (OUTPATIENT)
Dept: OBSTETRICS AND GYNECOLOGY | Facility: CLINIC | Age: 31
End: 2021-03-08

## 2021-03-08 VITALS — BODY MASS INDEX: 30.04 KG/M2 | SYSTOLIC BLOOD PRESSURE: 142 MMHG | WEIGHT: 175 LBS | DIASTOLIC BLOOD PRESSURE: 78 MMHG

## 2021-03-08 DIAGNOSIS — Z34.83 ENCOUNTER FOR SUPERVISION OF OTHER NORMAL PREGNANCY IN THIRD TRIMESTER: Primary | ICD-10-CM

## 2021-03-08 LAB
GLUCOSE UR STRIP-MCNC: NEGATIVE MG/DL
PROT UR STRIP-MCNC: NEGATIVE MG/DL

## 2021-03-08 PROCEDURE — 0502F SUBSEQUENT PRENATAL CARE: CPT | Performed by: OBSTETRICS & GYNECOLOGY

## 2021-03-08 RX ORDER — AMOXICILLIN 500 MG/1
1000 CAPSULE ORAL 2 TIMES DAILY
COMMUNITY
End: 2021-03-22

## 2021-03-08 NOTE — PROGRESS NOTES
Good fetal movement, had tooth extraction today  Labor precautions  Reviewed home BP log, running 130/78 mostly  GBS and cx's next visit  Growth US next visit for HTN    Diagnoses and all orders for this visit:    1. Encounter for supervision of other normal pregnancy in third trimester (Primary)

## 2021-03-22 ENCOUNTER — ROUTINE PRENATAL (OUTPATIENT)
Dept: OBSTETRICS AND GYNECOLOGY | Facility: CLINIC | Age: 31
End: 2021-03-22

## 2021-03-22 VITALS — BODY MASS INDEX: 31.07 KG/M2 | SYSTOLIC BLOOD PRESSURE: 144 MMHG | DIASTOLIC BLOOD PRESSURE: 80 MMHG | WEIGHT: 181 LBS

## 2021-03-22 DIAGNOSIS — Z34.83 ENCOUNTER FOR SUPERVISION OF OTHER NORMAL PREGNANCY IN THIRD TRIMESTER: Primary | ICD-10-CM

## 2021-03-22 LAB
GLUCOSE UR STRIP-MCNC: NEGATIVE MG/DL
PROT UR STRIP-MCNC: ABNORMAL MG/DL

## 2021-03-22 PROCEDURE — 0502F SUBSEQUENT PRENATAL CARE: CPT | Performed by: OBSTETRICS & GYNECOLOGY

## 2021-03-22 RX ORDER — HYDROCODONE BITARTRATE AND ACETAMINOPHEN 5; 325 MG/1; MG/1
1 TABLET ORAL EVERY 6 HOURS PRN
COMMUNITY
Start: 2021-03-08 | End: 2021-03-22

## 2021-03-22 NOTE — PROGRESS NOTES
Good fetal movement  Has had a few contractions  Reviewed home BP log, 130's / 70's  Cervix moderate, mid position  GBS and GC/Chl ordered and done  US today 66% growth, TED 12.5cm  Labor instructions    Diagnoses and all orders for this visit:    1. Encounter for supervision of other normal pregnancy in third trimester (Primary)    Other orders  -     Chlamydia trachomatis, Neisseria gonorrhoeae, PCR w/ confirmation - Swab, Cervix  -     Strep B Screen - Swab, Vaginal/Rectum

## 2021-03-24 LAB
C TRACH RRNA SPEC QL NAA+PROBE: NEGATIVE
GP B STREP DNA SPEC QL NAA+PROBE: NEGATIVE
N GONORRHOEA RRNA SPEC QL NAA+PROBE: NEGATIVE

## 2021-03-29 ENCOUNTER — ROUTINE PRENATAL (OUTPATIENT)
Dept: OBSTETRICS AND GYNECOLOGY | Facility: CLINIC | Age: 31
End: 2021-03-29

## 2021-03-29 VITALS — SYSTOLIC BLOOD PRESSURE: 134 MMHG | WEIGHT: 187 LBS | DIASTOLIC BLOOD PRESSURE: 84 MMHG | BODY MASS INDEX: 32.1 KG/M2

## 2021-03-29 DIAGNOSIS — Z34.83 ENCOUNTER FOR SUPERVISION OF OTHER NORMAL PREGNANCY IN THIRD TRIMESTER: Primary | ICD-10-CM

## 2021-03-29 LAB
GLUCOSE UR STRIP-MCNC: NEGATIVE MG/DL
PROT UR STRIP-MCNC: ABNORMAL MG/DL

## 2021-03-29 PROCEDURE — 0502F SUBSEQUENT PRENATAL CARE: CPT | Performed by: OBSTETRICS & GYNECOLOGY

## 2021-03-29 NOTE — PROGRESS NOTES
Good fetal movement  No contractions  Reviewed GBS negative  Labor instructions    Diagnoses and all orders for this visit:    1. Encounter for supervision of other normal pregnancy in third trimester (Primary)

## 2021-04-05 ENCOUNTER — ROUTINE PRENATAL (OUTPATIENT)
Dept: OBSTETRICS AND GYNECOLOGY | Facility: CLINIC | Age: 31
End: 2021-04-05

## 2021-04-05 VITALS — SYSTOLIC BLOOD PRESSURE: 154 MMHG | WEIGHT: 189 LBS | BODY MASS INDEX: 32.44 KG/M2 | DIASTOLIC BLOOD PRESSURE: 88 MMHG

## 2021-04-05 DIAGNOSIS — Z34.83 ENCOUNTER FOR SUPERVISION OF OTHER NORMAL PREGNANCY IN THIRD TRIMESTER: Primary | ICD-10-CM

## 2021-04-05 LAB
GLUCOSE UR STRIP-MCNC: NEGATIVE MG/DL
PROT UR STRIP-MCNC: NEGATIVE MG/DL

## 2021-04-05 PROCEDURE — 0502F SUBSEQUENT PRENATAL CARE: CPT | Performed by: OBSTETRICS & GYNECOLOGY

## 2021-04-05 NOTE — PROGRESS NOTES
Good fetal movement  No contractions  Cervix posterior, moderate  Reviewed GBS negative  Labor instructions    Diagnoses and all orders for this visit:    1. Encounter for supervision of other normal pregnancy in third trimester (Primary)

## 2021-04-12 ENCOUNTER — ROUTINE PRENATAL (OUTPATIENT)
Dept: OBSTETRICS AND GYNECOLOGY | Facility: CLINIC | Age: 31
End: 2021-04-12

## 2021-04-12 VITALS — BODY MASS INDEX: 32.1 KG/M2 | WEIGHT: 187 LBS | DIASTOLIC BLOOD PRESSURE: 84 MMHG | SYSTOLIC BLOOD PRESSURE: 152 MMHG

## 2021-04-12 DIAGNOSIS — Z34.83 ENCOUNTER FOR SUPERVISION OF OTHER NORMAL PREGNANCY IN THIRD TRIMESTER: Primary | ICD-10-CM

## 2021-04-12 LAB
GLUCOSE UR STRIP-MCNC: NEGATIVE MG/DL
PROT UR STRIP-MCNC: NEGATIVE MG/DL

## 2021-04-12 PROCEDURE — 0502F SUBSEQUENT PRENATAL CARE: CPT | Performed by: OBSTETRICS & GYNECOLOGY

## 2021-04-12 NOTE — PROGRESS NOTES
Good fetal movement  No contractions, BP same as 17 week visit  Head high, cervix posterior  Unfavorable cervix, discouraged elective induction at this time  Labor instructions    Diagnoses and all orders for this visit:    1. Encounter for supervision of other normal pregnancy in third trimester (Primary)

## 2021-04-17 ENCOUNTER — APPOINTMENT (OUTPATIENT)
Dept: ULTRASOUND IMAGING | Facility: HOSPITAL | Age: 31
End: 2021-04-17

## 2021-04-17 ENCOUNTER — HOSPITAL ENCOUNTER (INPATIENT)
Facility: HOSPITAL | Age: 31
LOS: 2 days | Discharge: HOME | End: 2021-04-19
Attending: OBSTETRICS & GYNECOLOGY | Admitting: OBSTETRICS & GYNECOLOGY

## 2021-04-17 ENCOUNTER — ANESTHESIA EVENT (OUTPATIENT)
Dept: LABOR AND DELIVERY | Facility: HOSPITAL | Age: 31
End: 2021-04-17

## 2021-04-17 ENCOUNTER — ANESTHESIA (OUTPATIENT)
Dept: LABOR AND DELIVERY | Facility: HOSPITAL | Age: 31
End: 2021-04-17

## 2021-04-17 DIAGNOSIS — Z37.9 NORMAL LABOR: Primary | ICD-10-CM

## 2021-04-17 PROBLEM — Z34.90 PREGNANCY: Status: ACTIVE | Noted: 2021-04-17

## 2021-04-17 LAB
ABO GROUP BLD: NORMAL
BASOPHILS # BLD AUTO: 0.03 10*3/MM3 (ref 0–0.2)
BASOPHILS NFR BLD AUTO: 0.2 % (ref 0–1.5)
BLD GP AB SCN SERPL QL: NEGATIVE
DEPRECATED RDW RBC AUTO: 53.3 FL (ref 37–54)
EOSINOPHIL # BLD AUTO: 0.03 10*3/MM3 (ref 0–0.4)
EOSINOPHIL NFR BLD AUTO: 0.2 % (ref 0.3–6.2)
ERYTHROCYTE [DISTWIDTH] IN BLOOD BY AUTOMATED COUNT: 20.4 % (ref 12.3–15.4)
HCT VFR BLD AUTO: 33.9 % (ref 34–46.6)
HGB BLD-MCNC: 9.9 G/DL (ref 12–15.9)
LYMPHOCYTES # BLD AUTO: 2.41 10*3/MM3 (ref 0.7–3.1)
LYMPHOCYTES NFR BLD AUTO: 17.7 % (ref 19.6–45.3)
MCH RBC QN AUTO: 21.8 PG (ref 26.6–33)
MCHC RBC AUTO-ENTMCNC: 29.2 G/DL (ref 31.5–35.7)
MCV RBC AUTO: 74.5 FL (ref 79–97)
MONOCYTES # BLD AUTO: 0.72 10*3/MM3 (ref 0.1–0.9)
MONOCYTES NFR BLD AUTO: 5.3 % (ref 5–12)
NEUTROPHILS NFR BLD AUTO: 10.37 10*3/MM3 (ref 1.7–7)
NEUTROPHILS NFR BLD AUTO: 76.1 % (ref 42.7–76)
PLATELET # BLD AUTO: 412 10*3/MM3 (ref 140–450)
PMV BLD AUTO: 10.9 FL (ref 6–12)
RBC # BLD AUTO: 4.55 10*6/MM3 (ref 3.77–5.28)
RH BLD: POSITIVE
T&S EXPIRATION DATE: NORMAL
WBC # BLD AUTO: 13.63 10*3/MM3 (ref 3.4–10.8)

## 2021-04-17 PROCEDURE — 10907ZC DRAINAGE OF AMNIOTIC FLUID, THERAPEUTIC FROM PRODUCTS OF CONCEPTION, VIA NATURAL OR ARTIFICIAL OPENING: ICD-10-PCS | Performed by: OBSTETRICS & GYNECOLOGY

## 2021-04-17 PROCEDURE — 86901 BLOOD TYPING SEROLOGIC RH(D): CPT | Performed by: OBSTETRICS & GYNECOLOGY

## 2021-04-17 PROCEDURE — 86850 RBC ANTIBODY SCREEN: CPT | Performed by: OBSTETRICS & GYNECOLOGY

## 2021-04-17 PROCEDURE — 59510 CESAREAN DELIVERY: CPT | Performed by: OBSTETRICS & GYNECOLOGY

## 2021-04-17 PROCEDURE — 25010000002 HYDROMORPHONE 1 MG/ML SOLUTION: Performed by: NURSE ANESTHETIST, CERTIFIED REGISTERED

## 2021-04-17 PROCEDURE — 86900 BLOOD TYPING SEROLOGIC ABO: CPT | Performed by: OBSTETRICS & GYNECOLOGY

## 2021-04-17 PROCEDURE — 76815 OB US LIMITED FETUS(S): CPT

## 2021-04-17 PROCEDURE — 25010000002 BUTORPHANOL PER 1 MG: Performed by: OBSTETRICS & GYNECOLOGY

## 2021-04-17 PROCEDURE — 51702 INSERT TEMP BLADDER CATH: CPT

## 2021-04-17 PROCEDURE — 88307 TISSUE EXAM BY PATHOLOGIST: CPT | Performed by: OBSTETRICS & GYNECOLOGY

## 2021-04-17 PROCEDURE — 25010000002 AZITHROMYCIN 500 MG/250 ML: Performed by: OBSTETRICS & GYNECOLOGY

## 2021-04-17 PROCEDURE — 85025 COMPLETE CBC W/AUTO DIFF WBC: CPT | Performed by: OBSTETRICS & GYNECOLOGY

## 2021-04-17 PROCEDURE — 25010000002 PHENYLEPHRINE HCL 0.8 MG/10ML SOLUTION PREFILLED SYRINGE: Performed by: NURSE ANESTHETIST, CERTIFIED REGISTERED

## 2021-04-17 PROCEDURE — 25010000002 KETOROLAC TROMETHAMINE PER 15 MG: Performed by: OBSTETRICS & GYNECOLOGY

## 2021-04-17 DEVICE — SEAL HEMO SURG ARISTA/AH ABS/PWDR 3GM: Type: IMPLANTABLE DEVICE | Site: ABDOMEN | Status: FUNCTIONAL

## 2021-04-17 RX ORDER — TRISODIUM CITRATE DIHYDRATE AND CITRIC ACID MONOHYDRATE 500; 334 MG/5ML; MG/5ML
SOLUTION ORAL
Status: COMPLETED
Start: 2021-04-17 | End: 2021-04-17

## 2021-04-17 RX ORDER — PHENYLEPHRINE HCL IN 0.9% NACL 0.8MG/10ML
SYRINGE (ML) INTRAVENOUS AS NEEDED
Status: DISCONTINUED | OUTPATIENT
Start: 2021-04-17 | End: 2021-04-17 | Stop reason: SURG

## 2021-04-17 RX ORDER — PROMETHAZINE HYDROCHLORIDE 12.5 MG/1
12.5 SUPPOSITORY RECTAL EVERY 6 HOURS PRN
Status: DISCONTINUED | OUTPATIENT
Start: 2021-04-17 | End: 2021-04-17 | Stop reason: HOSPADM

## 2021-04-17 RX ORDER — PROMETHAZINE HYDROCHLORIDE 25 MG/1
12.5 TABLET ORAL EVERY 6 HOURS PRN
Status: DISCONTINUED | OUTPATIENT
Start: 2021-04-17 | End: 2021-04-17 | Stop reason: HOSPADM

## 2021-04-17 RX ORDER — OXYCODONE AND ACETAMINOPHEN 7.5; 325 MG/1; MG/1
1 TABLET ORAL EVERY 4 HOURS PRN
Status: ACTIVE | OUTPATIENT
Start: 2021-04-17 | End: 2021-04-18

## 2021-04-17 RX ORDER — OXYCODONE AND ACETAMINOPHEN 10; 325 MG/1; MG/1
1 TABLET ORAL EVERY 4 HOURS PRN
Status: DISCONTINUED | OUTPATIENT
Start: 2021-04-18 | End: 2021-04-19 | Stop reason: HOSPADM

## 2021-04-17 RX ORDER — OXYTOCIN/0.9 % SODIUM CHLORIDE 30/500 ML
2-30 PLASTIC BAG, INJECTION (ML) INTRAVENOUS
Status: DISCONTINUED | OUTPATIENT
Start: 2021-04-17 | End: 2021-04-17 | Stop reason: HOSPADM

## 2021-04-17 RX ORDER — CALCIUM CARBONATE 200(500)MG
1 TABLET,CHEWABLE ORAL EVERY 6 HOURS PRN
Status: DISCONTINUED | OUTPATIENT
Start: 2021-04-17 | End: 2021-04-19 | Stop reason: HOSPADM

## 2021-04-17 RX ORDER — OXYTOCIN/0.9 % SODIUM CHLORIDE 30/500 ML
125 PLASTIC BAG, INJECTION (ML) INTRAVENOUS CONTINUOUS PRN
Status: DISCONTINUED | OUTPATIENT
Start: 2021-04-17 | End: 2021-04-17 | Stop reason: HOSPADM

## 2021-04-17 RX ORDER — SODIUM CHLORIDE, SODIUM LACTATE, POTASSIUM CHLORIDE, CALCIUM CHLORIDE 600; 310; 30; 20 MG/100ML; MG/100ML; MG/100ML; MG/100ML
125 INJECTION, SOLUTION INTRAVENOUS CONTINUOUS
Status: DISCONTINUED | OUTPATIENT
Start: 2021-04-17 | End: 2021-04-18

## 2021-04-17 RX ORDER — IBUPROFEN 800 MG/1
800 TABLET ORAL EVERY 8 HOURS PRN
Status: DISCONTINUED | OUTPATIENT
Start: 2021-04-17 | End: 2021-04-19 | Stop reason: HOSPADM

## 2021-04-17 RX ORDER — OXYTOCIN/0.9 % SODIUM CHLORIDE 30/500 ML
125 PLASTIC BAG, INJECTION (ML) INTRAVENOUS CONTINUOUS PRN
Status: DISCONTINUED | OUTPATIENT
Start: 2021-04-18 | End: 2021-04-19 | Stop reason: HOSPADM

## 2021-04-17 RX ORDER — OXYTOCIN/0.9 % SODIUM CHLORIDE 30/500 ML
250 PLASTIC BAG, INJECTION (ML) INTRAVENOUS CONTINUOUS
Status: ACTIVE | OUTPATIENT
Start: 2021-04-17 | End: 2021-04-17

## 2021-04-17 RX ORDER — MISOPROSTOL 200 UG/1
600 TABLET ORAL ONCE
Status: DISCONTINUED | OUTPATIENT
Start: 2021-04-18 | End: 2021-04-19 | Stop reason: HOSPADM

## 2021-04-17 RX ORDER — PRENATAL VIT/IRON FUM/FOLIC AC 27MG-0.8MG
1 TABLET ORAL DAILY
Status: DISCONTINUED | OUTPATIENT
Start: 2021-04-18 | End: 2021-04-19 | Stop reason: HOSPADM

## 2021-04-17 RX ORDER — ONDANSETRON 2 MG/ML
4 INJECTION INTRAMUSCULAR; INTRAVENOUS EVERY 6 HOURS PRN
Status: DISCONTINUED | OUTPATIENT
Start: 2021-04-17 | End: 2021-04-17

## 2021-04-17 RX ORDER — MISOPROSTOL 200 UG/1
800 TABLET ORAL AS NEEDED
Status: DISCONTINUED | OUTPATIENT
Start: 2021-04-17 | End: 2021-04-17 | Stop reason: HOSPADM

## 2021-04-17 RX ORDER — ONDANSETRON 4 MG/1
4 TABLET, FILM COATED ORAL EVERY 8 HOURS PRN
Status: DISCONTINUED | OUTPATIENT
Start: 2021-04-17 | End: 2021-04-17

## 2021-04-17 RX ORDER — ONDANSETRON 4 MG/1
4 TABLET, FILM COATED ORAL EVERY 6 HOURS PRN
Status: DISCONTINUED | OUTPATIENT
Start: 2021-04-17 | End: 2021-04-17

## 2021-04-17 RX ORDER — SODIUM CHLORIDE 0.9 % (FLUSH) 0.9 %
10 SYRINGE (ML) INJECTION AS NEEDED
Status: DISCONTINUED | OUTPATIENT
Start: 2021-04-17 | End: 2021-04-17 | Stop reason: HOSPADM

## 2021-04-17 RX ORDER — METHYLERGONOVINE MALEATE 0.2 MG/ML
200 INJECTION INTRAVENOUS AS NEEDED
Status: DISCONTINUED | OUTPATIENT
Start: 2021-04-17 | End: 2021-04-17 | Stop reason: HOSPADM

## 2021-04-17 RX ORDER — OXYTOCIN/0.9 % SODIUM CHLORIDE 30/500 ML
250 PLASTIC BAG, INJECTION (ML) INTRAVENOUS CONTINUOUS
Status: ACTIVE | OUTPATIENT
Start: 2021-04-18 | End: 2021-04-18

## 2021-04-17 RX ORDER — TERBUTALINE SULFATE 1 MG/ML
0.25 INJECTION, SOLUTION SUBCUTANEOUS AS NEEDED
Status: DISCONTINUED | OUTPATIENT
Start: 2021-04-17 | End: 2021-04-17 | Stop reason: HOSPADM

## 2021-04-17 RX ORDER — PROMETHAZINE HYDROCHLORIDE 25 MG/1
12.5 TABLET ORAL EVERY 4 HOURS PRN
Status: DISCONTINUED | OUTPATIENT
Start: 2021-04-17 | End: 2021-04-19 | Stop reason: HOSPADM

## 2021-04-17 RX ORDER — OXYCODONE AND ACETAMINOPHEN 7.5; 325 MG/1; MG/1
2 TABLET ORAL EVERY 4 HOURS PRN
Status: DISPENSED | OUTPATIENT
Start: 2021-04-17 | End: 2021-04-18

## 2021-04-17 RX ORDER — DOCUSATE SODIUM 100 MG/1
100 CAPSULE, LIQUID FILLED ORAL 2 TIMES DAILY PRN
Status: DISCONTINUED | OUTPATIENT
Start: 2021-04-17 | End: 2021-04-19 | Stop reason: HOSPADM

## 2021-04-17 RX ORDER — LIDOCAINE HYDROCHLORIDE 10 MG/ML
5 INJECTION, SOLUTION EPIDURAL; INFILTRATION; INTRACAUDAL; PERINEURAL AS NEEDED
Status: DISCONTINUED | OUTPATIENT
Start: 2021-04-17 | End: 2021-04-17 | Stop reason: HOSPADM

## 2021-04-17 RX ORDER — FAMOTIDINE 10 MG/ML
20 INJECTION, SOLUTION INTRAVENOUS ONCE AS NEEDED
Status: COMPLETED | OUTPATIENT
Start: 2021-04-17 | End: 2021-04-17

## 2021-04-17 RX ORDER — CARBOPROST TROMETHAMINE 250 UG/ML
250 INJECTION, SOLUTION INTRAMUSCULAR ONCE
Status: DISCONTINUED | OUTPATIENT
Start: 2021-04-18 | End: 2021-04-19 | Stop reason: HOSPADM

## 2021-04-17 RX ORDER — BUPIVACAINE HCL/0.9 % NACL/PF 0.1 %
2 PLASTIC BAG, INJECTION (ML) EPIDURAL ONCE
Status: DISCONTINUED | OUTPATIENT
Start: 2021-04-17 | End: 2021-04-17 | Stop reason: HOSPADM

## 2021-04-17 RX ORDER — BUTORPHANOL TARTRATE 1 MG/ML
0.5 INJECTION, SOLUTION INTRAMUSCULAR; INTRAVENOUS EVERY 6 HOURS PRN
Status: DISCONTINUED | OUTPATIENT
Start: 2021-04-17 | End: 2021-04-19 | Stop reason: HOSPADM

## 2021-04-17 RX ORDER — BUPIVACAINE HYDROCHLORIDE 7.5 MG/ML
INJECTION, SOLUTION EPIDURAL; RETROBULBAR
Status: COMPLETED | OUTPATIENT
Start: 2021-04-17 | End: 2021-04-17

## 2021-04-17 RX ORDER — SODIUM CHLORIDE 0.9 % (FLUSH) 0.9 %
3 SYRINGE (ML) INJECTION EVERY 12 HOURS SCHEDULED
Status: DISCONTINUED | OUTPATIENT
Start: 2021-04-17 | End: 2021-04-17 | Stop reason: HOSPADM

## 2021-04-17 RX ORDER — OXYTOCIN 10 [USP'U]/ML
INJECTION, SOLUTION INTRAMUSCULAR; INTRAVENOUS AS NEEDED
Status: DISCONTINUED | OUTPATIENT
Start: 2021-04-17 | End: 2021-04-17 | Stop reason: SURG

## 2021-04-17 RX ORDER — EPHEDRINE SULFATE 50 MG/ML
INJECTION, SOLUTION INTRAVENOUS AS NEEDED
Status: DISCONTINUED | OUTPATIENT
Start: 2021-04-17 | End: 2021-04-17 | Stop reason: SURG

## 2021-04-17 RX ORDER — FAMOTIDINE 10 MG/ML
INJECTION, SOLUTION INTRAVENOUS
Status: COMPLETED
Start: 2021-04-17 | End: 2021-04-17

## 2021-04-17 RX ORDER — BUTORPHANOL TARTRATE 1 MG/ML
1 INJECTION, SOLUTION INTRAMUSCULAR; INTRAVENOUS
Status: DISCONTINUED | OUTPATIENT
Start: 2021-04-17 | End: 2021-04-17 | Stop reason: HOSPADM

## 2021-04-17 RX ORDER — OXYTOCIN/0.9 % SODIUM CHLORIDE 30/500 ML
999 PLASTIC BAG, INJECTION (ML) INTRAVENOUS ONCE
Status: DISCONTINUED | OUTPATIENT
Start: 2021-04-17 | End: 2021-04-17 | Stop reason: HOSPADM

## 2021-04-17 RX ORDER — SIMETHICONE 80 MG
80 TABLET,CHEWABLE ORAL 4 TIMES DAILY PRN
Status: DISCONTINUED | OUTPATIENT
Start: 2021-04-17 | End: 2021-04-19 | Stop reason: HOSPADM

## 2021-04-17 RX ORDER — DIPHENHYDRAMINE HCL 25 MG
25 CAPSULE ORAL EVERY 4 HOURS PRN
Status: DISCONTINUED | OUTPATIENT
Start: 2021-04-17 | End: 2021-04-19 | Stop reason: HOSPADM

## 2021-04-17 RX ORDER — TRISODIUM CITRATE DIHYDRATE AND CITRIC ACID MONOHYDRATE 500; 334 MG/5ML; MG/5ML
15 SOLUTION ORAL ONCE
Status: COMPLETED | OUTPATIENT
Start: 2021-04-17 | End: 2021-04-17

## 2021-04-17 RX ORDER — CARBOPROST TROMETHAMINE 250 UG/ML
250 INJECTION, SOLUTION INTRAMUSCULAR AS NEEDED
Status: DISCONTINUED | OUTPATIENT
Start: 2021-04-17 | End: 2021-04-17 | Stop reason: HOSPADM

## 2021-04-17 RX ORDER — NALOXONE HCL 0.4 MG/ML
0.4 VIAL (ML) INJECTION
Status: ACTIVE | OUTPATIENT
Start: 2021-04-17 | End: 2021-04-18

## 2021-04-17 RX ORDER — PANTOPRAZOLE SODIUM 40 MG/1
40 TABLET, DELAYED RELEASE ORAL
Status: DISCONTINUED | OUTPATIENT
Start: 2021-04-18 | End: 2021-04-19 | Stop reason: HOSPADM

## 2021-04-17 RX ORDER — OXYTOCIN/0.9 % SODIUM CHLORIDE 30/500 ML
999 PLASTIC BAG, INJECTION (ML) INTRAVENOUS ONCE
Status: DISCONTINUED | OUTPATIENT
Start: 2021-04-17 | End: 2021-04-19 | Stop reason: HOSPADM

## 2021-04-17 RX ORDER — KETOROLAC TROMETHAMINE 30 MG/ML
30 INJECTION, SOLUTION INTRAMUSCULAR; INTRAVENOUS EVERY 6 HOURS PRN
Status: DISPENSED | OUTPATIENT
Start: 2021-04-17 | End: 2021-04-18

## 2021-04-17 RX ORDER — OXYCODONE HYDROCHLORIDE AND ACETAMINOPHEN 5; 325 MG/1; MG/1
1 TABLET ORAL EVERY 4 HOURS PRN
Status: DISCONTINUED | OUTPATIENT
Start: 2021-04-18 | End: 2021-04-19 | Stop reason: HOSPADM

## 2021-04-17 RX ADMIN — FAMOTIDINE 20 MG: 10 INJECTION, SOLUTION INTRAVENOUS at 19:29

## 2021-04-17 RX ADMIN — Medication 320 MCG: at 19:40

## 2021-04-17 RX ADMIN — SODIUM CHLORIDE, POTASSIUM CHLORIDE, SODIUM LACTATE AND CALCIUM CHLORIDE: 600; 310; 30; 20 INJECTION, SOLUTION INTRAVENOUS at 20:40

## 2021-04-17 RX ADMIN — EPHEDRINE SULFATE 15 MG: 50 INJECTION INTRAVENOUS at 20:01

## 2021-04-17 RX ADMIN — SODIUM CHLORIDE, POTASSIUM CHLORIDE, SODIUM LACTATE AND CALCIUM CHLORIDE 125 ML/HR: 600; 310; 30; 20 INJECTION, SOLUTION INTRAVENOUS at 18:15

## 2021-04-17 RX ADMIN — FAMOTIDINE 20 MG: 10 INJECTION INTRAVENOUS at 19:29

## 2021-04-17 RX ADMIN — TRISODIUM CITRATE DIHYDRATE AND CITRIC ACID MONOHYDRATE 15 ML: 500; 334 SOLUTION ORAL at 19:28

## 2021-04-17 RX ADMIN — HYDROMORPHONE HYDROCHLORIDE 900 MCG: 1 INJECTION, SOLUTION INTRAMUSCULAR; INTRAVENOUS; SUBCUTANEOUS at 19:58

## 2021-04-17 RX ADMIN — Medication 320 MCG: at 19:48

## 2021-04-17 RX ADMIN — Medication 320 MCG: at 19:59

## 2021-04-17 RX ADMIN — OXYTOCIN 30 UNITS: 10 INJECTION, SOLUTION INTRAMUSCULAR; INTRAVENOUS at 19:57

## 2021-04-17 RX ADMIN — OXYTOCIN 20 UNITS: 10 INJECTION, SOLUTION INTRAMUSCULAR; INTRAVENOUS at 20:40

## 2021-04-17 RX ADMIN — SODIUM CITRATE AND CITRIC ACID MONOHYDRATE 15 ML: 500; 334 SOLUTION ORAL at 19:28

## 2021-04-17 RX ADMIN — KETOROLAC TROMETHAMINE 30 MG: 30 INJECTION, SOLUTION INTRAMUSCULAR; INTRAVENOUS at 22:32

## 2021-04-17 RX ADMIN — OXYTOCIN-SODIUM CHLORIDE 0.9% IV SOLN 30 UNIT/500ML 2 MILLI-UNITS/MIN: 30-0.9/5 SOLUTION at 12:11

## 2021-04-17 RX ADMIN — BUPIVACAINE HYDROCHLORIDE 1.5 ML: 7.5 INJECTION, SOLUTION EPIDURAL; RETROBULBAR at 19:38

## 2021-04-17 RX ADMIN — BUTORPHANOL TARTRATE 1 MG: 1 INJECTION, SOLUTION INTRAMUSCULAR; INTRAVENOUS at 15:51

## 2021-04-17 RX ADMIN — EPHEDRINE SULFATE 15 MG: 50 INJECTION INTRAVENOUS at 19:59

## 2021-04-17 RX ADMIN — SODIUM CHLORIDE, POTASSIUM CHLORIDE, SODIUM LACTATE AND CALCIUM CHLORIDE 125 ML/HR: 600; 310; 30; 20 INJECTION, SOLUTION INTRAVENOUS at 09:45

## 2021-04-17 RX ADMIN — EPHEDRINE SULFATE 15 MG: 50 INJECTION INTRAVENOUS at 20:14

## 2021-04-17 RX ADMIN — HYDROMORPHONE HYDROCHLORIDE 100 MCG: 1 INJECTION, SOLUTION INTRAMUSCULAR; INTRAVENOUS; SUBCUTANEOUS at 19:37

## 2021-04-17 RX ADMIN — AZITHROMYCIN 500 MG: 500 INJECTION, POWDER, LYOPHILIZED, FOR SOLUTION INTRAVENOUS at 19:43

## 2021-04-17 NOTE — H&P
[facility]  Maya Fields  : 1990  MRN: [id number]  CSN: [id number]    History and Physical    Subjective   Maya Fields is a 30 y.o. year old  with an Estimated Date of Delivery: 21 currently at 40w1d presenting with leaking fluid.    Prenatal care has been with Dr. Fidencio Juarez.  It has been benign.    OB History    Para Term  AB Living   2 0 0 0 1 0   SAB TAB Ectopic Molar Multiple Live Births   0 0 0 0 0 0      # Outcome Date GA Lbr Roberto/2nd Weight Sex Delivery Anes PTL Lv   2 Current            1 AB              Past Medical History:   Diagnosis Date   • Anemia    • GERD (gastroesophageal reflux disease)    • Nephrolithiasis    • UC (ulcerative colitis) (CMS/HCC)     Diagnosis , pancolitis     Past Surgical History:   Procedure Laterality Date   • CHOLECYSTECTOMY     • COLONOSCOPY     • COLONOSCOPY Left 10/17/2016    Procedure: COLONOSCOPY WITH ANESTHESIA;  Surgeon: Jordan Mcdaniel MD;  Location: [facility] ENDOSCOPY;  Service:      No current facility-administered medications for this encounter.    Allergies   Allergen Reactions   • Zofran [Ondansetron Hcl] Palpitations     Social History    Tobacco Use      Smoking status: Former Smoker      Smokeless tobacco: Never Used    Review of Systems      Objective   /75   Pulse 79   LMP 2020 (Approximate)   Breastfeeding No   General: well developed; well nourished  no acute distress   Heart: Not performed.   Lungs: breathing is unlabored   Abdomen: soft, non-tender; no masses  no umbilical or inguinal hernias are present  no hepato-splenomegaly   FHT's: reactive, reassuring   Cervix: was checked (by RN): 1 cm / 40 % / Floating  EFW 8 pounds  Pelvis seems clinically adequate   Presentation: cephalic   Contractions: regular every 4-5 minutes - external monitors used     Prenatal Labs  Lab Results   Component Value Date    HGB 8.2 (L) 2021    HEPBSAG Negative 2020    ABO A 2020    RH Positive  09/14/2020    ABSCRN Negative 09/14/2020    QIK3OLA3 Non Reactive 09/14/2020    HEPCVIRUSABY Negative 10/17/2016    URINECX Final report 09/14/2020       Current Labs Reviewed   No data reviewed       Assessment   1. IUP at 40w1d  2. Group B strep status: negative  3. Ruptured- TED was 1.4     Plan   1. Admit to labor and delivery    2. Plan for Pitocin    Krista Wayne DO  4/17/2021  10:05 T

## 2021-04-18 LAB
BASOPHILS # BLD AUTO: 0.03 10*3/MM3 (ref 0–0.2)
BASOPHILS NFR BLD AUTO: 0.2 % (ref 0–1.5)
DEPRECATED RDW RBC AUTO: 53.1 FL (ref 37–54)
EOSINOPHIL # BLD AUTO: 0 10*3/MM3 (ref 0–0.4)
EOSINOPHIL NFR BLD AUTO: 0 % (ref 0.3–6.2)
ERYTHROCYTE [DISTWIDTH] IN BLOOD BY AUTOMATED COUNT: 19.8 % (ref 12.3–15.4)
HCT VFR BLD AUTO: 25.3 % (ref 34–46.6)
HGB BLD-MCNC: 7.6 G/DL (ref 12–15.9)
LYMPHOCYTES # BLD AUTO: 1.8 10*3/MM3 (ref 0.7–3.1)
LYMPHOCYTES NFR BLD AUTO: 10.7 % (ref 19.6–45.3)
MCH RBC QN AUTO: 22.4 PG (ref 26.6–33)
MCHC RBC AUTO-ENTMCNC: 30 G/DL (ref 31.5–35.7)
MCV RBC AUTO: 74.6 FL (ref 79–97)
MONOCYTES # BLD AUTO: 0.83 10*3/MM3 (ref 0.1–0.9)
MONOCYTES NFR BLD AUTO: 4.9 % (ref 5–12)
NEUTROPHILS NFR BLD AUTO: 14.08 10*3/MM3 (ref 1.7–7)
NEUTROPHILS NFR BLD AUTO: 83.8 % (ref 42.7–76)
PLATELET # BLD AUTO: 335 10*3/MM3 (ref 140–450)
PMV BLD AUTO: 10.6 FL (ref 6–12)
RBC # BLD AUTO: 3.39 10*6/MM3 (ref 3.77–5.28)
WBC # BLD AUTO: 16.8 10*3/MM3 (ref 3.4–10.8)

## 2021-04-18 PROCEDURE — 94799 UNLISTED PULMONARY SVC/PX: CPT

## 2021-04-18 PROCEDURE — 85025 COMPLETE CBC W/AUTO DIFF WBC: CPT | Performed by: OBSTETRICS & GYNECOLOGY

## 2021-04-18 PROCEDURE — 63710000001 PROMETHAZINE PER 25 MG: Performed by: OBSTETRICS & GYNECOLOGY

## 2021-04-18 RX ORDER — ONDANSETRON 4 MG/1
4 TABLET, FILM COATED ORAL EVERY 6 HOURS PRN
Status: DISCONTINUED | OUTPATIENT
Start: 2021-04-18 | End: 2021-04-18

## 2021-04-18 RX ORDER — MISOPROSTOL 200 UG/1
800 TABLET ORAL AS NEEDED
Status: DISCONTINUED | OUTPATIENT
Start: 2021-04-18 | End: 2021-04-19 | Stop reason: HOSPADM

## 2021-04-18 RX ORDER — IBUPROFEN 800 MG/1
800 TABLET ORAL EVERY 8 HOURS PRN
Status: DISCONTINUED | OUTPATIENT
Start: 2021-04-18 | End: 2021-04-19 | Stop reason: HOSPADM

## 2021-04-18 RX ORDER — METHYLERGONOVINE MALEATE 0.2 MG/ML
200 INJECTION INTRAVENOUS ONCE AS NEEDED
Status: DISCONTINUED | OUTPATIENT
Start: 2021-04-18 | End: 2021-04-19 | Stop reason: HOSPADM

## 2021-04-18 RX ORDER — PROMETHAZINE HYDROCHLORIDE 12.5 MG/1
12.5 SUPPOSITORY RECTAL EVERY 6 HOURS PRN
Status: DISCONTINUED | OUTPATIENT
Start: 2021-04-18 | End: 2021-04-19 | Stop reason: HOSPADM

## 2021-04-18 RX ORDER — ONDANSETRON 2 MG/ML
4 INJECTION INTRAMUSCULAR; INTRAVENOUS EVERY 6 HOURS PRN
Status: DISCONTINUED | OUTPATIENT
Start: 2021-04-18 | End: 2021-04-18

## 2021-04-18 RX ORDER — CARBOPROST TROMETHAMINE 250 UG/ML
250 INJECTION, SOLUTION INTRAMUSCULAR AS NEEDED
Status: DISCONTINUED | OUTPATIENT
Start: 2021-04-18 | End: 2021-04-19 | Stop reason: HOSPADM

## 2021-04-18 RX ORDER — PROMETHAZINE HYDROCHLORIDE 25 MG/1
12.5 TABLET ORAL EVERY 6 HOURS PRN
Status: DISCONTINUED | OUTPATIENT
Start: 2021-04-18 | End: 2021-04-19 | Stop reason: HOSPADM

## 2021-04-18 RX ADMIN — PRENATAL VIT W/ FE FUMARATE-FA TAB 27-0.8 MG 1 TABLET: 27-0.8 TAB at 09:06

## 2021-04-18 RX ADMIN — Medication 3 ML: at 01:36

## 2021-04-18 RX ADMIN — Medication 80 MG: at 23:30

## 2021-04-18 RX ADMIN — PROMETHAZINE HYDROCHLORIDE 12.5 MG: 25 TABLET ORAL at 01:50

## 2021-04-18 RX ADMIN — DOCUSATE SODIUM 100 MG: 100 CAPSULE ORAL at 20:03

## 2021-04-18 RX ADMIN — OXYCODONE HYDROCHLORIDE AND ACETAMINOPHEN 2 TABLET: 7.5; 325 TABLET ORAL at 16:15

## 2021-04-18 RX ADMIN — PANTOPRAZOLE SODIUM 40 MG: 40 TABLET, DELAYED RELEASE ORAL at 07:54

## 2021-04-18 RX ADMIN — IBUPROFEN 800 MG: 800 TABLET, FILM COATED ORAL at 20:03

## 2021-04-18 RX ADMIN — Medication 250 ML/HR: at 01:51

## 2021-04-18 RX ADMIN — OXYCODONE HYDROCHLORIDE AND ACETAMINOPHEN 1 TABLET: 10; 325 TABLET ORAL at 23:29

## 2021-04-18 NOTE — PLAN OF CARE
Problem: Adult Inpatient Plan of Care  Goal: Plan of Care Review  Outcome: Ongoing, Progressing  Flowsheets  Taken 4/18/2021 0601 by Mehnaz Becerra RN  Progress: improving  Outcome Summary: VSS, FFML UU.  ALT incision C/D/I.  Small area on left side of incision with rhonda serosanguinous drainage noted.  Incision is well approximated.  Pt is wearing an abdominal binder.  Encouraged patient to increase fluid intake to help increase urinary output.  IVF LR infusing at 125ml/hr.  Emptied 300ml from catheter.  Pt has been nauseated most of this shift since being transferred from [facility].  Gave pt Phenergan 12.5mg and some saltine crackers.  Stated she had felt better.  A few hours later pt vomitted 300ml.  States she feels much better now.  Will continue to monitor patient's VS, pain and nausea.  Bonding well with baby.  Taken 4/17/2021 2222 by Sussy Davis RN  Plan of Care Reviewed With:   patient   significant other  Goal: Patient-Specific Goal (Individualized)  Outcome: Ongoing, Progressing  Flowsheets (Taken 4/18/2021 0601)  Patient-Specific Goals (Include Timeframe): Home with healthy baby via formula feeding by discharge  Individualized Care Needs: Continue to monitor patient for nausea, pain level and fluid intake  Anxieties, Fears or Concerns: None voiced at this time  Goal: Absence of Hospital-Acquired Illness or Injury  Outcome: Ongoing, Progressing  Intervention: Identify and Manage Fall Risk  Recent Flowsheet Documentation  Taken 4/18/2021 0500 by Mehnaz Becerra, RN  Safety Promotion/Fall Prevention: safety round/check completed  Taken 4/18/2021 0400 by Mehnaz Becerra, RN  Safety Promotion/Fall Prevention: safety round/check completed  Taken 4/18/2021 0315 by Mehnaz Becerra, RN  Safety Promotion/Fall Prevention: safety round/check completed  Taken 4/18/2021 0223 by Mehnaz Becerra, RN  Safety Promotion/Fall Prevention: safety round/check completed  Taken 4/18/2021 0145 by Mehnaz Becerra,  RN  Safety Promotion/Fall Prevention: safety round/check completed  Taken 2021 0025 by Mehnaz Becerra RN  Safety Promotion/Fall Prevention: safety round/check completed  Taken 2021 2320 by Mehnaz Becerra RN  Safety Promotion/Fall Prevention: safety round/check completed  Intervention: Prevent and Manage VTE (venous thromboembolism) Risk  Recent Flowsheet Documentation  Taken 2021 232 by Mehnaz Becerra RN  VTE Prevention/Management: sequential compression devices on  Goal: Optimal Comfort and Wellbeing  Outcome: Ongoing, Progressing  Intervention: Provide Person-Centered Care  Recent Flowsheet Documentation  Taken 2021 by Mehnaz Becerra RN  Trust Relationship/Rapport: care explained  Goal: Readiness for Transition of Care  Outcome: Ongoing, Progressing     Problem: Bleeding ( Delivery)  Goal: Bleeding is Controlled  Outcome: Ongoing, Progressing     Problem: Infection ( Delivery)  Goal: Absence of Infection Signs and Symptoms  Outcome: Ongoing, Progressing     Problem: Respiratory Compromise ( Delivery)  Goal: Effective Oxygenation and Ventilation  Outcome: Ongoing, Progressing     Problem: Device-Related Complication Risk (Anesthesia/Analgesia, Neuraxial)  Goal: Safe Infusion Delivery Completion  Outcome: Ongoing, Progressing     Problem: Infection (Anesthesia/Analgesia, Neuraxial)  Goal: Absence of Infection Signs and Symptoms  Outcome: Ongoing, Progressing     Problem: Nausea and Vomiting (Anesthesia/Analgesia, Neuraxial)  Goal: Nausea and Vomiting Relief  Outcome: Ongoing, Progressing     Problem: Pain (Anesthesia/Analgesia, Neuraxial)  Goal: Effective Pain Control  Outcome: Ongoing, Progressing  Intervention: Prevent or Manage Pain  Recent Flowsheet Documentation  Taken 2021 by Mehnaz Becerra RN  Pain Management Interventions:   relaxation techniques promoted   position adjusted   pillow support provided     Problem: Respiratory  Compromise (Anesthesia/Analgesia, Neuraxial)  Goal: Effective Oxygenation and Ventilation  Outcome: Ongoing, Progressing     Problem: Sensorimotor Impairment (Anesthesia/Analgesia, Neuraxial)  Goal: Baseline Motor Function  Outcome: Ongoing, Progressing  Intervention: Optimize Sensorimotor Function  Recent Flowsheet Documentation  Taken 2021 0500 by Mehnaz Becerra RN  Safety Promotion/Fall Prevention: safety round/check completed  Taken 2021 0400 by Mehnaz Becerra RN  Safety Promotion/Fall Prevention: safety round/check completed  Taken 2021 0315 by Mehnaz Becerra RN  Safety Promotion/Fall Prevention: safety round/check completed  Taken 2021 0223 by Mehnaz Becerra RN  Safety Promotion/Fall Prevention: safety round/check completed  Taken 2021 0145 by Mehnaz Becerra RN  Safety Promotion/Fall Prevention: safety round/check completed  Taken 2021 0025 by Mehnaz Becerra RN  Safety Promotion/Fall Prevention: safety round/check completed  Taken 2021 2320 by Mehnaz Becerra RN  Safety Promotion/Fall Prevention: safety round/check completed     Problem: Urinary Retention (Anesthesia/Analgesia, Neuraxial)  Goal: Effective Urinary Elimination  Outcome: Ongoing, Progressing     Problem: Adjustment to Role Transition (Postpartum  Delivery)  Goal: Successful Maternal Role Transition  Outcome: Ongoing, Progressing     Problem: Bleeding (Postpartum  Delivery)  Goal: Hemostasis  Outcome: Ongoing, Progressing     Problem: Infection (Postpartum  Delivery)  Goal: Absence of Infection Signs and Symptoms  Outcome: Ongoing, Progressing     Problem: Pain (Postpartum  Delivery)  Goal: Acceptable Pain Control  Outcome: Ongoing, Progressing  Intervention: Prevent or Manage Pain  Recent Flowsheet Documentation  Taken 2021 2320 by Mehnaz Becerra RN  Pain Management Interventions:   relaxation techniques promoted   position adjusted   pillow  support provided     Problem: Postoperative Nausea and Vomiting (Postpartum  Delivery)  Goal: Nausea and Vomiting Relief  Outcome: Ongoing, Progressing     Problem: Postoperative Urinary Retention (Postpartum  Delivery)  Goal: Effective Urinary Elimination  Outcome: Ongoing, Progressing     Problem:  Fall Injury Risk  Goal: Absence of Fall, Infant Drop and Related Injury  Outcome: Ongoing, Progressing  Intervention: Promote Injury-Free Environment  Recent Flowsheet Documentation  Taken 2021 0500 by Mehnaz Becerra RN  Safety Promotion/Fall Prevention: safety round/check completed  Taken 2021 0400 by Mehnaz Becerra RN  Safety Promotion/Fall Prevention: safety round/check completed  Taken 2021 0315 by Mehnaz Becerra RN  Safety Promotion/Fall Prevention: safety round/check completed  Taken 2021 0223 by Mehnaz Becerra RN  Safety Promotion/Fall Prevention: safety round/check completed  Taken 2021 0145 by Mehnaz Becerra RN  Safety Promotion/Fall Prevention: safety round/check completed  Taken 2021 0025 by Mehnaz Becerra RN  Safety Promotion/Fall Prevention: safety round/check completed  Taken 2021 2320 by Mehnaz Becerra RN  Safety Promotion/Fall Prevention: safety round/check completed   Goal Outcome Evaluation:     Progress: improving  Outcome Summary: VSS, FFML UU.  ALT incision C/D/I.  Small area on left side of incision with rhonda serosanguinous drainage noted.  Incision is well approximated.  Pt is wearing an abdominal binder.  Encouraged patient to increase fluid intake to help increase urinary output.  IVF LR infusing at 125ml/hr.  Emptied 300ml from catheter.  Pt has been nauseated most of this shift since being transferred from [facility].  Gave pt Phenergan 12.5mg and some saltine crackers.  Stated she had felt better.  A few hours later pt vomitted 300ml.  States she feels much better now.  Will continue to monitor patient's VS, pain  and nausea.  Bonding well with baby.

## 2021-04-18 NOTE — ANESTHESIA PREPROCEDURE EVALUATION
Anesthesia Evaluation     Patient summary reviewed and Nursing notes reviewed   no history of anesthetic complications:  NPO Solid Status: > 8 hours  NPO Liquid Status: > 8 hours           Airway   Mallampati: I  TM distance: <3 FB  Neck ROM: full  no difficulty expected  Dental      Pulmonary - negative pulmonary ROS   Cardiovascular - negative cardio ROS  Exercise tolerance: good (4-7 METS)    (-) valvular problems/murmurs, dysrhythmias, angina      Neuro/Psych- negative ROS  GI/Hepatic/Renal/Endo    (+)  GERD,      ROS Comment: Ulcerative colitis    Musculoskeletal (-) negative ROS    Abdominal    Substance History - negative use     OB/GYN    (+) Pregnant,         Other                          Anesthesia Plan    ASA 2     spinal       Anesthetic plan, all risks, benefits, and alternatives have been provided, discussed and informed consent has been obtained with: patient.

## 2021-04-18 NOTE — ANESTHESIA PROCEDURE NOTES
Spinal Block    Pre-sedation assessment completed: 4/17/2021 7:35 PM    Patient reassessed immediately prior to procedure    Patient location during procedure: OB  Start Time: 4/17/2021 7:36 PM  Stop Time: 4/17/2021 7:38 PM  Indication:at surgeon's request  Performed By  CRNA: Rajat Briggs CRNA  Preanesthetic Checklist  Completed: patient identified, IV checked, site marked, risks and benefits discussed, surgical consent, monitors and equipment checked, pre-op evaluation and timeout performed  Spinal Block Prep:  Patient Position:sitting  Sterile Tech:cap, gloves and sterile barriers  Prep:DuraPrep  Patient Monitoring:EKG, continuous pulse oximetry and blood pressure monitoring  Spinal Block Procedure  Approach:midline  Guidance:palpation technique  Location:L4-L5  Needle Type:Frank  Needle Gauge:25 G  Placement of Spinal needle event:cerebrospinal fluid aspirated  Paresthesia: no  Fluid Appearance:clear  Medications: bupivacaine PF (MARCAINE) 0.75%, 1.5 mL  Med Administered at 4/17/2021 7:38 PM   Post Assessment  Patient Tolerance:patient tolerated the procedure well with no apparent complications  Complications no

## 2021-04-18 NOTE — PLAN OF CARE
Goal Outcome Evaluation:      Afebrile.  BPs 141/90 to 152/86.  Fundus F, ML, UU to U1.  Scant to small lochia.  Edges of abd inc well approx, dermabond intact.  Using abd binder.  No flatus, + BS.  Pt has used PO Percocet x 1 this shift for pain.  Ambulating in room and castle.  Bonding appr with infant.

## 2021-04-18 NOTE — OP NOTE
[facility]  Maya Fields  : 1990  MRN: [id number]  CSN: [id number]  Date: 2021    Operative Note     SECTION PRIMARY      Pre-op Diagnosis:  Failed induction of labor    Post-op Diagnosis:  Failed induction of labor    Procedure: Procedure(s):   SECTION PRIMARY   Surgeon: Surgeon(s):  Krista Wayne DO       Anesthesia: Choice by CHANDRIKA Briggs CRNA     Estimated Blood Loss: 808   mls by QBL   Fluids: 1000   mls   UOP: 50   mls   Drains: de la cruz   ABx: Ancef and Azithro     Specimens:  Placenta and cord   Findings: Normal uterus, tubes and ovaries    Complications: None       INDICATION: Maya Fields is a 30 y.o. female 2 para 0-0-1-0 at 40 weeks 1 day who was initially admitted with ruptured membranes.  Patient was subsequently placed on Pitocin.  She was on Pitocin for approximately 6 hours with no change in her cervical exam.  It will had was not even engaged in the pelvis and when checked was noted to be floating.  Discussed with patient risk benefits to proceeding with  section versus continuing with Pitocin at this time.  Patient elected to proceed with  section.  Risk benefits and alternatives were discussed with the patient the patient elected to proceed.     PROCEDURE: After informed consent was obtained, the patient was taken to the operating room where spinal anesthesia was administered. Time out procedure was completed and perioperative antibiotics were administered. She was placed in the supine position with leftward tilt and her abdomen was prepped and draped in normal sterile fashion after a De La Cruz catheter was placed by nursing staff.   After confirming adequate anesthesia, a Pfannenstiel incision was made with a scalpel 2 fingerbreadths above the pubic symphysis.  This was carried down sharply to the underlying fascia which was incised in the midline.  The fascial incision was extended laterally with Boone scissors.  The fascial edges were then elevated  and the underlying rectus muscles dissected off with sharp technique.  The rectus muscles were  in the midline and the underlying peritoneum identified and entered bluntly.  The peritoneal incision was then extended and the Juvenal-0 retractor inserted.  The vesicouterine peritoneum was sharply incised with Metzenbaum scissors to create a bladder flap.  A low transverse uterine incision was made with a clean scalpel.  The uterine incision was bluntly extended and amniotomy was performed returning clear fluid.  The head of the infant was delivered through the incision without difficulty and the remainder the infant delivered with fundal pressure.  The infant was vigorous on the field, the cord was clamped and cut, and the infant handed off to waiting nursery nurse after delayed cord clamping of 30 seconds.  Cord blood and gases were obtained and the placenta then was manually removed.  The uterus was exteriorized and cleared of clot and debris with a moist laparotomy sponge.  The uterine incision was closed with a running locked suture of 0 Monocryl.  A second imbricating layer of 0 Monocryl was placed for reinforcement.  The uterine incision was hemostatic.  The uterus was gently returned to the abdomen.  The Juvenal O retractor was then removed.  The uterine incision was reinspected with hemostasis noted.  Trent was applied. The parietal peritoneum was then closed with a running suture of 2-0 Vicryl and the rectus muscles were reapproximated.  The subfascial spaces and rectus muscles were inspected with hemostasis noted.  The fascia was then closed with a running suture of 0 Vicryl.  The subcutaneous tissues were irrigated and made hemostatic with Bovie cautery.  The subcutaneous tissues were reapproximated with interrupted sutures of 2-0 plain gut.  The skin was closed with a subcuticular stitch of 4-0 vicryl and reinforced with skinafix.   After expressing the uterus the patient was transitioned to the  christina and taken to the recovery room in stable condition.  She tolerated the procedure well without complications.  All sponge, needle, and instrument counts were correct ×3 per the OR staff.    Krista Wayne DO   4/17/2021  20:49 CDT

## 2021-04-18 NOTE — PLAN OF CARE
"  Problem: Adult Inpatient Plan of Care  Goal: Plan of Care Review  Flowsheets (Taken 4/17/2021 2222)  Progress: improving  Plan of Care Reviewed With:   patient   significant other  Outcome Summary: Pt currently C/O pain @ a level \"6\" out of \"10\"- waiting for pharmacy to verify TORDOL. Uterus currently @ the U,firm, midline, with output scant. Recovery period up @ 2300.     Problem: Adult Inpatient Plan of Care  Goal: Patient-Specific Goal (Individualized)  4/17/2021 2338 by Sussy Davis RN  Flowsheets  Taken 4/17/2021 2338  Patient-Specific Goals (Include Timeframe): patient states she would like to be up and walking by early morning  Taken 4/17/2021 2222  Individualized Care Needs: Patient informed about what pain meds are ordered and when she can have them. patient also shown relaxation techniques and breathing practices to help ease her pain  Anxieties, Fears or Concerns: pt states she is concerned about keeping pain under control     Problem: Adult Inpatient Plan of Care  Goal: Absence of Hospital-Acquired Illness or Injury  Outcome: Ongoing, Progressing     Problem: Adult Inpatient Plan of Care  Goal: Absence of Hospital-Acquired Illness or Injury  Intervention: Identify and Manage Fall Risk  Description: Perform standard risk assessment with a validated tool or comprehensive approach appropriate to the patient on admission; reassess fall risk frequently, with change in status or transfer to another level of care.  Communicate fall injury risk to interprofessional healthcare team.  Determine need for increased observation, equipment and environmental modification, such as low bed and signage, as well as supportive, nonskid footwear.  Adjust safety measures to individual developmental age, stage and identified risk factors.  Reinforce the importance of safety and physical activity with patient and family.  Perform regular intentional rounding to assess need for position change, pain assessment, personal " needs, including assistance with toileting.  Recent Flowsheet Documentation  Taken 4/17/2021 1915 by Sussy Davis RN  Safety Promotion/Fall Prevention:   safety round/check completed   assistive device/personal items within reach   clutter free environment maintained     Problem: Adult Inpatient Plan of Care  Goal: Absence of Hospital-Acquired Illness or Injury  Intervention: Prevent Skin Injury  Description: Assess skin risk on admission and at regular intervals throughout hospital stay.  Keep all areas of skin (especially folds) clean and dry.  Maintain adequate skin hydration.  Relieve and redistribute pressure and protect bony prominences; implement measures based on patient-specific risk factors.  Match turning and repositioning schedule to clinical condition.  Encourage weight shift frequently; assist with reposition if unable to complete independently.  Float heels off bed. Avoid pressure on the Achilles tendon.  Keep skin free from extended contact with medical devices.  Use aids (e.g., slide boards, mechanical lift) during transfer.  Recent Flowsheet Documentation  Taken 4/17/2021 2200 by Sussy Davis RN  Body Position: supine  Taken 4/17/2021 2055 by Sussy Davis RN  Body Position: supine  Taken 4/17/2021 1915 by Sussy Davis RN  Body Position: sitting up in bed     Problem: Adult Inpatient Plan of Care  Goal: Absence of Hospital-Acquired Illness or Injury  Intervention: Prevent and Manage VTE (venous thromboembolism) Risk  Description: Assess for VTE risk.  Encourage/assist with early ambulation.  Initiate and maintain compression or other therapy, as indicated based on identified risk in accordance with organizational protocol/provider order.  Encourage both active and passive leg exercises while in bed, if unable to ambulate.  Recent Flowsheet Documentation  Taken 4/17/2021 2055 by Sussy Davis RN  VTE Prevention/Management:   bilateral   sequential compression devices  on  Taken 4/17/2021 1915 by Sussy Davis RN  VTE Prevention/Management: (pt ambulatory) other (see comments)     Problem: Adult Inpatient Plan of Care  Goal: Optimal Comfort and Wellbeing  Outcome: Ongoing, Progressing     Problem: Adult Inpatient Plan of Care  Goal: Optimal Comfort and Wellbeing  Intervention: Provide Person-Centered Care  Description: Use a family-focused approach to care.  Develop trust and rapport by proactively providing information, encouraging questions, addressing concerns and offering reassurance.  Acknowledge emotional response to hospitalization.  Recognize and utilize personal coping strategies.  Honor spiritual and cultural preferences.  Recent Flowsheet Documentation  Taken 4/17/2021 2055 by Sussy Davis, RN  Trust Relationship/Rapport:   care explained   choices provided   emotional support provided   empathic listening provided   questions answered   questions encouraged   reassurance provided   thoughts/feelings acknowledged  Taken 4/17/2021 1915 by Sussy Davis, RN  Trust Relationship/Rapport:   care explained   choices provided   emotional support provided   empathic listening provided   questions answered   questions encouraged   reassurance provided   thoughts/feelings acknowledged     Problem: Adult Inpatient Plan of Care  Goal: Readiness for Transition of Care  Outcome: Ongoing, Progressing     Problem: Change in Fetal Wellbeing (Labor)  Goal: Stable Fetal Wellbeing  Intervention: Promote and Monitor Fetal Wellbeing  Description: Evaluate fetal heart rate tracing.  Facilitate maternal lateral position change to improve uteroplacental blood flow and maternal blood pressure; utilize hip wedge as needed.  Monitor uterine contraction pattern; assess for presence of tachysystole.  Prepare to discontinue oxytocin or labor induction agent in the presence of tachysystole, as applicable.  Anticipate need for tocolytic administration if tachysystole persists.  Prepare  for intravenous fluid bolus administration to improve maternal fluid status and treat hypotension.  Administer oxygen therapy for maternal hypoxia.  Review maternal medication history for possible impact on fetal heart rate tracing (e.g., corticosteroid).  Prepare for additional procedure, as indicated, to improve fetal wellbeing (e.g., amnioinfusion, fetal stimulation).  Modify pushing effort, if in second stage of labor.  Prepare for expedited delivery if fetal status does not improve.  Recent Flowsheet Documentation  Taken 2021 by Sussy Davis RN  Body Position: supine  Taken 2021 by Sussy Davis RN  Body Position: supine  Taken 2021 by Sussy Davis RN  Body Position: sitting up in bed     Problem: Bleeding ( Delivery)  Goal: Bleeding is Controlled  Outcome: Ongoing, Progressing     Problem: Change in Fetal Wellbeing ( Delivery)  Goal: Stable Fetal Wellbeing  Outcome: Met     Problem: Change in Fetal Wellbeing ( Delivery)  Goal: Stable Fetal Wellbeing  Intervention: Promote and Monitor Fetal Wellbeing  Description: Evaluate fetal heart rate tracing.  Facilitate maternal lateral position change to improve uteroplacental blood flow and maternal blood pressure, utilize hip wedge as needed.  Monitor uterine contraction pattern; assess for presence of tachysystole.  Anticipate need for tocolytic administration if tachysystole persists.  Prepare for intravenous fluid bolus administration to improve maternal fluid status and treat hypotension.  Administer oxygen therapy for maternal hypoxia.  Prepare for expedited delivery if fetal status does not improve.  Recent Flowsheet Documentation  Taken 2021 by Sussy Davis RN  Body Position: supine  Taken 2021 by Sussy Davis RN  Body Position: supine  Taken 2021 by Sussy Davis RN  Body Position: sitting up in bed     Problem: Infection (  Delivery)  Goal: Absence of Infection Signs and Symptoms  Outcome: Ongoing, Progressing     Problem: Respiratory Compromise ( Delivery)  Goal: Effective Oxygenation and Ventilation  Outcome: Ongoing, Progressing     Problem: Infection (Anesthesia/Analgesia, Neuraxial)  Goal: Absence of Infection Signs and Symptoms  Outcome: Ongoing, Progressing     Problem: Nausea and Vomiting (Anesthesia/Analgesia, Neuraxial)  Goal: Nausea and Vomiting Relief  Outcome: Ongoing, Progressing     Problem: Pain (Anesthesia/Analgesia, Neuraxial)  Goal: Effective Pain Control  Outcome: Ongoing, Progressing     Problem: Respiratory Compromise (Anesthesia/Analgesia, Neuraxial)  Goal: Effective Oxygenation and Ventilation  Outcome: Ongoing, Progressing     Problem: Respiratory Compromise (Anesthesia/Analgesia, Neuraxial)  Goal: Effective Oxygenation and Ventilation  Intervention: Optimize Oxygenation and Ventilation  Description: Use a validated screening tool to identify risk for obstructive sleep apnea prior to placement; monitor for signs of hypoventilation.  Implement continuous pulse oximetry to detect apnea-related oxygen desaturation events.  Maintain patent airway; assess need for additional respiratory support.  Elevate head of bed and position to minimize risk of ventilation/perfusion mismatch, airway collapse/obstruction and aspiration.  Stimulate patient to increase arousal and respiratory effort.  Encourage pulmonary hygiene such as cough-enhancement and airway-clearance techniques (e.g., incentive spirometry, deep breathing, cough).  Provide oxygen therapy judiciously, if hypoxemia is present.  Recent Flowsheet Documentation  Taken 2021 by Sussy Davis RN  Head of Bed ([facility]): HOB at 15 degrees  Taken 2021 by Sussy Davis RN  Head of Bed ([facility]): HOB at 20 degrees  Taken 2021 by Sussy Davis RN  Head of Bed ([facility]): HOB at 90 degrees     Problem: Sensorimotor Impairment  (Anesthesia/Analgesia, Neuraxial)  Goal: Baseline Motor Function  Outcome: Ongoing, Progressing     Problem: Sensorimotor Impairment (Anesthesia/Analgesia, Neuraxial)  Goal: Baseline Motor Function  Intervention: Optimize Sensorimotor Function  Description: Protect skin and areas of decreased sensation from moisture, heat, pressure, friction or shearing forces.  Position body with joints in neutral alignment; facilitate frequent position changes.  Monitor vital signs, oxygenation and dermatomes for level of anesthesia.  Monitor motor strength and ability to safely ambulate.  Implement environmental safety measures to decrease fall risk (e.g., declutter, lighting, assistive devices); reassess risk frequently.  Recent Flowsheet Documentation  Taken 2021 191 by Sussy Davis RN  Safety Promotion/Fall Prevention:   safety round/check completed   assistive device/personal items within reach   clutter free environment maintained     Problem: Urinary Retention (Anesthesia/Analgesia, Neuraxial)  Goal: Effective Urinary Elimination  Outcome: Ongoing, Progressing     Problem: Adjustment to Role Transition (Postpartum  Delivery)  Goal: Successful Maternal Role Transition  Outcome: Ongoing, Progressing     Problem: Pain (Postpartum  Delivery)  Goal: Acceptable Pain Control  Intervention: Prevent or Manage Pain  Description: Develop mutual pain management plan with patient and caregiver; review regularly.  Monitor for the presence of incisional pain; provide timely pain management interventions when present.  Use cold application, as culturally-appropriate, to the incisional area for the first 24 to 48 hours to enhance comfort.  Encourage early ambulation, when able, to help avoid gas accumulation which can add to discomfort.  Verify correct infant latch when breastfeeding to prevent nipple pain.  Consider the presence and impact of preexisting chronic pain.  Encourage patient and caregiver  involvement in pain assessment, interventions and safety measures.  Individualize pharmacologic pain management plan; titrate medication to patient response.  Combine multimodal analgesia and nonpharmacologic strategies to help potentiate synergistic effects and enhance comfort (e.g., complementary therapy, diversional activity).  Offer around-the-clock dosing of pain medication to keep pain levels in control.  Manage medication-induced effects, such as constipation, nausea, vomiting.  Support and optimize psychosocial response to pain.  If engorgement occurs, encourage more frequent breastfeeding or pumping and storing additional milk to ease discomfort.  Note: Cold compresses, as culturally-appropriate, may be used if bottle-feeding.  If post-dural puncture headache identified, encourage adequate hydration and anticipate the need for epidural blood patch.  If hemorrhoids are present and painful, offer topical pain relief and sitz baths for comfort.  Recent Flowsheet Documentation  Taken 2021 by Sussy Davis RN  Pain Management Interventions:   quiet environment facilitated   no interventions per patient request  Taken 2021 2231 by Sussy Davis RN  Pain Management Interventions:   see MAR   quiet environment facilitated  Taken 2021 by Sussy Davis RN  Pain Management Interventions:   no interventions per patient request   quiet environment facilitated  Taken 2021 205 by Sussy Davis RN  Pain Management Interventions:   quiet environment facilitated   position adjusted   pillow support provided   relaxation techniques promoted  Taken 2021 1915 by Sussy Davis, RN  Pain Management Interventions: relaxation techniques promoted     Problem:  Fall Injury Risk  Goal: Absence of Fall, Infant Drop and Related Injury  Intervention: Promote Injury-Free Environment  Description: Provide a safe, barrier-free environment that encourages independent  "activity.  Keep care area uncluttered and well-lighted.  Determine need for increased observation or auditory alerts (e.g., bed or chair alarm).  Assess equipment and environmental modification needs (e.g., low bed, signage, nonskid footwear, grab bars).  Avoid use of restraints.  Recent Flowsheet Documentation  Taken 4/17/2021 1915 by Sussy Davis RN  Safety Promotion/Fall Prevention:   safety round/check completed   assistive device/personal items within reach   clutter free environment maintained   Goal Outcome Evaluation:  Plan of Care Reviewed With: patient, significant other  Progress: improving  Outcome Summary: Pt currently C/O pain @ a level \"6\" out of \"10\"- waiting for pharmacy to verify TORDOL. Uterus currently @ the U,firm, midline, with output scant. Recovery period up @ 2300.  "

## 2021-04-18 NOTE — PROGRESS NOTES
[place]  Maya Fields  : 1990  MRN: [id number]  CSN: [id number]    Labor progress note    Subjective   She reports is feeling painful contraction.  She has been getting Stadol for pain management.  She ruptured initially at 5:30 AM this morning.     Objective   Min/max vitals past 24 hours:  Temp  Min: 98 °F (36.7 °C)  Max: 98.2 °F (36.8 °C)   BP  Min: 129/72  Max: 148/84   Pulse  Min: 69  Max: 91   Resp  Min: 16  Max: 18        FHT's: 140-150 bpm, mod variability + accels, variable decelerations  external monitors used   Cervix: was checked (by me): 1 cm / 40 % / Floating   Contractions: regular every 2-3 minutes - external monitors used Pitocin currently at 18 mu      Assessment   1. IUP at 40w1d  2. Ruptured approximately 15 hours     Plan   1.    section  Allow labor to continue pending maternal and fetal status  Plan discussed with family and questions answered.  Understanding verbalized.   Discussed with patient risk benefits to  section.  Risk including bleeding, infection as well as damage to underlying organs was discussed with patient at this time.  Patient elected to proceed.    Krista Wayne DO  2021  19:14 CDT

## 2021-04-18 NOTE — PROGRESS NOTES
[facility]   PROGRESS NOTE    Post-Op Day 1 S/P   Subjective     Patient reports:  Pain is well controlled with prescription NSAID's including ketorolac (Toradol).  She is ambulating. Tolerating diet. Voiding - without difficulty; flatus reported..  Vaginal bleeding is light.      Objective      Vitals: Vital Signs Range for the last 24 hours  Temperature: Temp:  [97.2 °F (36.2 °C)-98.8 °F (37.1 °C)] 97.4 °F (36.3 °C)   Temp Source: Temp src: Temporal   BP: BP: (129-151)/(65-94) 141/90   Pulse: Heart Rate:  [] 104   Respirations: Resp:  [16-18] 16   SPO2: SpO2:  [94 %-100 %] 100 %   O2 Amount (l/min):     O2 Devices Device (Oxygen Therapy): room air   Weight: Weight:  [83.5 kg (184 lb)] 83.5 kg (184 lb)            Physical Exam    Lungs normal effort   Abdomen Soft, non-tender, normal bowel sounds; no bruits, organomegaly or masses.   Incision  healing well, no drainage, no erythema, no seroma, no swelling, well approximated   Extremities extremities normal, atraumatic, no cyanosis or edema     I reviewed the patient's new clinical results.  Lab Results (last 24 hours)     Procedure Component Value Units Date/Time    CBC & Differential [[id number]]  (Abnormal) Collected: 21    Specimen: Blood Updated: 21    Narrative:      The following orders were created for panel order CBC & Differential.  Procedure                               Abnormality         Status                     ---------                               -----------         ------                     CBC Auto Differential[[id number]]        Abnormal            Final result                 Please view results for these tests on the individual orders.    CBC Auto Differential [[id number]]  (Abnormal) Collected: 21    Specimen: Blood Updated: 21     WBC 16.80 10*3/mm3      RBC 3.39 10*6/mm3      Hemoglobin 7.6 g/dL      Hematocrit 25.3 %      MCV 74.6 fL      MCH 22.4 pg      MCHC 30.0 g/dL       RDW 19.8 %      RDW-SD 53.1 fl      MPV 10.6 fL      Platelets 335 10*3/mm3      Neutrophil % 83.8 %      Lymphocyte % 10.7 %      Monocyte % 4.9 %      Eosinophil % 0.0 %      Basophil % 0.2 %      Neutrophils, Absolute 14.08 10*3/mm3      Lymphocytes, Absolute 1.80 10*3/mm3      Monocytes, Absolute 0.83 10*3/mm3      Eosinophils, Absolute 0.00 10*3/mm3      Basophils, Absolute 0.03 10*3/mm3           Assessment/Plan        Pregnancy    Normal labor      Assessment:    Maya Fields is Day 1  post-partum  , Low Transverse   .       Plan:  continue post op care.        Krista Wayne DO  2021  10:15 CDT

## 2021-04-19 VITALS
HEART RATE: 79 BPM | DIASTOLIC BLOOD PRESSURE: 80 MMHG | TEMPERATURE: 97.9 F | BODY MASS INDEX: 31.41 KG/M2 | SYSTOLIC BLOOD PRESSURE: 139 MMHG | OXYGEN SATURATION: 100 % | WEIGHT: 184 LBS | RESPIRATION RATE: 20 BRPM | HEIGHT: 64 IN

## 2021-04-19 RX ORDER — OXYCODONE AND ACETAMINOPHEN 10; 325 MG/1; MG/1
1 TABLET ORAL EVERY 4 HOURS PRN
Qty: 20 TABLET | Refills: 0 | Status: SHIPPED | OUTPATIENT
Start: 2021-04-19 | End: 2021-04-25

## 2021-04-19 RX ADMIN — PRENATAL VIT W/ FE FUMARATE-FA TAB 27-0.8 MG 1 TABLET: 27-0.8 TAB at 08:20

## 2021-04-19 RX ADMIN — OXYCODONE HYDROCHLORIDE AND ACETAMINOPHEN 1 TABLET: 10; 325 TABLET ORAL at 06:53

## 2021-04-19 RX ADMIN — IBUPROFEN 800 MG: 800 TABLET, FILM COATED ORAL at 06:54

## 2021-04-19 RX ADMIN — PANTOPRAZOLE SODIUM 40 MG: 40 TABLET, DELAYED RELEASE ORAL at 06:54

## 2021-04-19 NOTE — LACTATION NOTE
Mother has chosen to formula feed. Has sports bra on currently. Non-nursing mother's packet given and reviewed. Discussed signs of milk, s/s/tx of engorgement and mastitis. Mother denies questions. Encouragement and support provided.

## 2021-04-19 NOTE — PLAN OF CARE
Problem: Adult Inpatient Plan of Care  Goal: Plan of Care Review  Outcome: Ongoing, Progressing  Flowsheets  Taken 2021 0516 by Mehnaz Becerra, RN  Progress: improving  Outcome Summary: VSS, FFML U1 with scant bleeding.  ALT incision with Dermabond and is C/D/I with no drainage noted.  Pt ambulating frequently in room, voiding without difficulty and passing gas.  Bonding well with baby.  Pain medications given x 1 with good relief.  Pt would like to go home today.  Taken 2021 by Sussy Davis RN  Plan of Care Reviewed With:   patient   significant other  Goal: Patient-Specific Goal (Individualized)  Outcome: Ongoing, Progressing  Flowsheets (Taken 2021 0601)  Patient-Specific Goals (Include Timeframe): Home with healthy baby via formula feeding by discharge  Individualized Care Needs: Continue to monitor patient for nausea, pain level and fluid intake  Anxieties, Fears or Concerns: None voiced at this time  Goal: Absence of Hospital-Acquired Illness or Injury  Outcome: Ongoing, Progressing  Intervention: Identify and Manage Fall Risk  Recent Flowsheet Documentation  Taken 2021 0514 by Mehnaz Becerra, RN  Safety Promotion/Fall Prevention: safety round/check completed  Taken 2021 0400 by Mehnaz Becerra RN  Safety Promotion/Fall Prevention: safety round/check completed  Taken 2021 0300 by Mehnaz Becerra, RN  Safety Promotion/Fall Prevention: safety round/check completed  Taken 2021 0200 by Mehnaz Becerra, RN  Safety Promotion/Fall Prevention: safety round/check completed  Goal: Optimal Comfort and Wellbeing  Outcome: Ongoing, Progressing  Goal: Readiness for Transition of Care  Outcome: Ongoing, Progressing     Problem: Bleeding ( Delivery)  Goal: Bleeding is Controlled  Outcome: Ongoing, Progressing     Problem: Infection ( Delivery)  Goal: Absence of Infection Signs and Symptoms  Outcome: Ongoing, Progressing     Problem: Respiratory  Compromise ( Delivery)  Goal: Effective Oxygenation and Ventilation  Outcome: Ongoing, Progressing     Problem: Device-Related Complication Risk (Anesthesia/Analgesia, Neuraxial)  Goal: Safe Infusion Delivery Completion  Outcome: Ongoing, Progressing     Problem: Infection (Anesthesia/Analgesia, Neuraxial)  Goal: Absence of Infection Signs and Symptoms  Outcome: Ongoing, Progressing     Problem: Nausea and Vomiting (Anesthesia/Analgesia, Neuraxial)  Goal: Nausea and Vomiting Relief  Outcome: Ongoing, Progressing     Problem: Pain (Anesthesia/Analgesia, Neuraxial)  Goal: Effective Pain Control  Outcome: Ongoing, Progressing  Intervention: Prevent or Manage Pain  Recent Flowsheet Documentation  Taken 2021 0514 by Mehnaz Becerra RN  Pain Management Interventions: relaxation techniques promoted  Taken 2021 0400 by Mehnaz Becerra RN  Pain Management Interventions: relaxation techniques promoted  Taken 2021 0300 by Mehnaz Becerra RN  Pain Management Interventions: relaxation techniques promoted  Taken 2021 0200 by Mehnaz Becerra RN  Pain Management Interventions: relaxation techniques promoted     Problem: Respiratory Compromise (Anesthesia/Analgesia, Neuraxial)  Goal: Effective Oxygenation and Ventilation  Outcome: Ongoing, Progressing     Problem: Sensorimotor Impairment (Anesthesia/Analgesia, Neuraxial)  Goal: Baseline Motor Function  Outcome: Ongoing, Progressing  Intervention: Optimize Sensorimotor Function  Recent Flowsheet Documentation  Taken 2021 0514 by Mehnaz Becerra RN  Safety Promotion/Fall Prevention: safety round/check completed  Taken 2021 0400 by Mehnaz Becerra RN  Safety Promotion/Fall Prevention: safety round/check completed  Taken 2021 0300 by Mehnaz Becerra RN  Safety Promotion/Fall Prevention: safety round/check completed  Taken 2021 0200 by Mehnaz Becerra RN  Safety Promotion/Fall Prevention: safety round/check  completed     Problem: Urinary Retention (Anesthesia/Analgesia, Neuraxial)  Goal: Effective Urinary Elimination  Outcome: Ongoing, Progressing     Problem: Adjustment to Role Transition (Postpartum  Delivery)  Goal: Successful Maternal Role Transition  Outcome: Ongoing, Progressing     Problem: Bleeding (Postpartum  Delivery)  Goal: Hemostasis  Outcome: Ongoing, Progressing     Problem: Infection (Postpartum  Delivery)  Goal: Absence of Infection Signs and Symptoms  Outcome: Ongoing, Progressing     Problem: Pain (Postpartum  Delivery)  Goal: Acceptable Pain Control  Outcome: Ongoing, Progressing  Intervention: Prevent or Manage Pain  Recent Flowsheet Documentation  Taken 2021 0514 by Mehnaz Becerra RN  Pain Management Interventions: relaxation techniques promoted  Taken 2021 0400 by Mehnaz Becerra RN  Pain Management Interventions: relaxation techniques promoted  Taken 2021 0300 by Mehnaz Becerra RN  Pain Management Interventions: relaxation techniques promoted  Taken 2021 0200 by Mehnaz Becerra RN  Pain Management Interventions: relaxation techniques promoted     Problem: Postoperative Nausea and Vomiting (Postpartum  Delivery)  Goal: Nausea and Vomiting Relief  Outcome: Ongoing, Progressing     Problem: Postoperative Urinary Retention (Postpartum  Delivery)  Goal: Effective Urinary Elimination  Outcome: Ongoing, Progressing     Problem:  Fall Injury Risk  Goal: Absence of Fall, Infant Drop and Related Injury  Outcome: Ongoing, Progressing  Intervention: Promote Injury-Free Environment  Recent Flowsheet Documentation  Taken 2021 0514 by Mehnaz Becerra RN  Safety Promotion/Fall Prevention: safety round/check completed  Taken 2021 0400 by Mehnaz Becerra RN  Safety Promotion/Fall Prevention: safety round/check completed  Taken 2021 0300 by Mehnaz Becerra RN  Safety Promotion/Fall Prevention: safety  round/check completed  Taken 2021 0200 by Mehnaz Becerra, RN  Safety Promotion/Fall Prevention: safety round/check completed     Problem: Adult Inpatient Plan of Care  Goal: Plan of Care Review  Outcome: Ongoing, Progressing  Flowsheets  Taken 2021 0516 by Mehnaz Becerra, RN  Progress: improving  Outcome Summary: VSS, FFML U1 with scant bleeding.  ALT incision with Dermabond and is C/D/I with no drainage noted.  Pt ambulating frequently in room, voiding without difficulty and passing gas.  Bonding well with baby.  Pain medications given x 1 with good relief.  Pt would like to go home today.  Taken 20212 by Sussy Davis RN  Plan of Care Reviewed With:   patient   significant other  Goal: Patient-Specific Goal (Individualized)  Outcome: Ongoing, Progressing  Flowsheets (Taken 2021 0601)  Patient-Specific Goals (Include Timeframe): Home with healthy baby via formula feeding by discharge  Individualized Care Needs: Continue to monitor patient for nausea, pain level and fluid intake  Anxieties, Fears or Concerns: None voiced at this time  Goal: Absence of Hospital-Acquired Illness or Injury  Outcome: Ongoing, Progressing  Intervention: Identify and Manage Fall Risk  Recent Flowsheet Documentation  Taken 2021 0514 by Mehnaz Becerra RN  Safety Promotion/Fall Prevention: safety round/check completed  Taken 2021 0400 by Mehnaz Becerra, RN  Safety Promotion/Fall Prevention: safety round/check completed  Taken 2021 0300 by Mehnaz Becerra RN  Safety Promotion/Fall Prevention: safety round/check completed  Taken 2021 0200 by Mehnaz Becerra RN  Safety Promotion/Fall Prevention: safety round/check completed  Goal: Optimal Comfort and Wellbeing  Outcome: Ongoing, Progressing  Goal: Readiness for Transition of Care  Outcome: Ongoing, Progressing     Problem: Bleeding ( Delivery)  Goal: Bleeding is Controlled  Outcome: Ongoing, Progressing     Problem:  Infection ( Delivery)  Goal: Absence of Infection Signs and Symptoms  Outcome: Ongoing, Progressing     Problem: Respiratory Compromise ( Delivery)  Goal: Effective Oxygenation and Ventilation  Outcome: Ongoing, Progressing     Problem: Device-Related Complication Risk (Anesthesia/Analgesia, Neuraxial)  Goal: Safe Infusion Delivery Completion  Outcome: Ongoing, Progressing     Problem: Infection (Anesthesia/Analgesia, Neuraxial)  Goal: Absence of Infection Signs and Symptoms  Outcome: Ongoing, Progressing     Problem: Nausea and Vomiting (Anesthesia/Analgesia, Neuraxial)  Goal: Nausea and Vomiting Relief  Outcome: Ongoing, Progressing     Problem: Pain (Anesthesia/Analgesia, Neuraxial)  Goal: Effective Pain Control  Outcome: Ongoing, Progressing  Intervention: Prevent or Manage Pain  Recent Flowsheet Documentation  Taken 2021 0514 by Mehnaz Becerra RN  Pain Management Interventions: relaxation techniques promoted  Taken 2021 0400 by Mehnaz Becerra RN  Pain Management Interventions: relaxation techniques promoted  Taken 2021 0300 by Mehnaz Becerra RN  Pain Management Interventions: relaxation techniques promoted  Taken 2021 0200 by Mehnaz Becerra RN  Pain Management Interventions: relaxation techniques promoted     Problem: Respiratory Compromise (Anesthesia/Analgesia, Neuraxial)  Goal: Effective Oxygenation and Ventilation  Outcome: Ongoing, Progressing     Problem: Sensorimotor Impairment (Anesthesia/Analgesia, Neuraxial)  Goal: Baseline Motor Function  Outcome: Ongoing, Progressing  Intervention: Optimize Sensorimotor Function  Recent Flowsheet Documentation  Taken 2021 0514 by Mehnaz Becerra RN  Safety Promotion/Fall Prevention: safety round/check completed  Taken 2021 0400 by Mehnaz Becerra RN  Safety Promotion/Fall Prevention: safety round/check completed  Taken 2021 0300 by Mehnaz Becerra RN  Safety Promotion/Fall Prevention: safety  round/check completed  Taken 2021 0200 by Mehnaz Becerra RN  Safety Promotion/Fall Prevention: safety round/check completed     Problem: Urinary Retention (Anesthesia/Analgesia, Neuraxial)  Goal: Effective Urinary Elimination  Outcome: Ongoing, Progressing     Problem: Adjustment to Role Transition (Postpartum  Delivery)  Goal: Successful Maternal Role Transition  Outcome: Ongoing, Progressing     Problem: Bleeding (Postpartum  Delivery)  Goal: Hemostasis  Outcome: Ongoing, Progressing     Problem: Infection (Postpartum  Delivery)  Goal: Absence of Infection Signs and Symptoms  Outcome: Ongoing, Progressing     Problem: Pain (Postpartum  Delivery)  Goal: Acceptable Pain Control  Outcome: Ongoing, Progressing  Intervention: Prevent or Manage Pain  Recent Flowsheet Documentation  Taken 2021 0514 by Mehnaz Becerra RN  Pain Management Interventions: relaxation techniques promoted  Taken 2021 0400 by Mehnaz Becerra RN  Pain Management Interventions: relaxation techniques promoted  Taken 2021 0300 by Mehnaz Becerra RN  Pain Management Interventions: relaxation techniques promoted  Taken 2021 0200 by Mehnaz Becerra RN  Pain Management Interventions: relaxation techniques promoted     Problem: Postoperative Nausea and Vomiting (Postpartum  Delivery)  Goal: Nausea and Vomiting Relief  Outcome: Ongoing, Progressing     Problem: Postoperative Urinary Retention (Postpartum  Delivery)  Goal: Effective Urinary Elimination  Outcome: Ongoing, Progressing     Problem:  Fall Injury Risk  Goal: Absence of Fall, Infant Drop and Related Injury  Outcome: Ongoing, Progressing  Intervention: Promote Injury-Free Environment  Recent Flowsheet Documentation  Taken 2021 0514 by Mehnaz Becerra RN  Safety Promotion/Fall Prevention: safety round/check completed  Taken 2021 0400 by Mehnaz Becerra RN  Safety Promotion/Fall  Prevention: safety round/check completed  Taken 4/19/2021 0300 by Mehnaz Becerra RN  Safety Promotion/Fall Prevention: safety round/check completed  Taken 4/19/2021 0200 by Mehnaz Becerra RN  Safety Promotion/Fall Prevention: safety round/check completed     Problem: Adult Inpatient Plan of Care  Goal: Plan of Care Review  Outcome: Ongoing, Progressing  Flowsheets  Taken 4/19/2021 0516 by Mehnaz Becerra, RN  Progress: improving  Outcome Summary: VSS, FFML U1 with scant bleeding.  ALT incision with Dermabond and is C/D/I with no drainage noted.  Pt ambulating frequently in room, voiding without difficulty and passing gas.  Bonding well with baby.  Pain medications given x 1 with good relief.  Pt would like to go home today.  Taken 4/17/2021 2222 by Sussy Davis RN  Plan of Care Reviewed With:   patient   significant other  Goal: Patient-Specific Goal (Individualized)  Outcome: Ongoing, Progressing  Flowsheets (Taken 4/18/2021 0601)  Patient-Specific Goals (Include Timeframe): Home with healthy baby via formula feeding by discharge  Individualized Care Needs: Continue to monitor patient for nausea, pain level and fluid intake  Anxieties, Fears or Concerns: None voiced at this time  Goal: Absence of Hospital-Acquired Illness or Injury  Outcome: Ongoing, Progressing  Intervention: Identify and Manage Fall Risk  Recent Flowsheet Documentation  Taken 4/19/2021 0514 by Mehnaz Becerra RN  Safety Promotion/Fall Prevention: safety round/check completed  Taken 4/19/2021 0400 by Mehnaz Becerra RN  Safety Promotion/Fall Prevention: safety round/check completed  Taken 4/19/2021 0300 by Mehnaz Becerra RN  Safety Promotion/Fall Prevention: safety round/check completed  Taken 4/19/2021 0200 by Mehnaz Becerra, RN  Safety Promotion/Fall Prevention: safety round/check completed  Goal: Optimal Comfort and Wellbeing  Outcome: Ongoing, Progressing  Goal: Readiness for Transition of Care  Outcome: Ongoing,  Progressing     Problem: Bleeding ( Delivery)  Goal: Bleeding is Controlled  Outcome: Ongoing, Progressing     Problem: Infection ( Delivery)  Goal: Absence of Infection Signs and Symptoms  Outcome: Ongoing, Progressing     Problem: Respiratory Compromise ( Delivery)  Goal: Effective Oxygenation and Ventilation  Outcome: Ongoing, Progressing     Problem: Device-Related Complication Risk (Anesthesia/Analgesia, Neuraxial)  Goal: Safe Infusion Delivery Completion  Outcome: Ongoing, Progressing     Problem: Infection (Anesthesia/Analgesia, Neuraxial)  Goal: Absence of Infection Signs and Symptoms  Outcome: Ongoing, Progressing     Problem: Nausea and Vomiting (Anesthesia/Analgesia, Neuraxial)  Goal: Nausea and Vomiting Relief  Outcome: Ongoing, Progressing     Problem: Pain (Anesthesia/Analgesia, Neuraxial)  Goal: Effective Pain Control  Outcome: Ongoing, Progressing  Intervention: Prevent or Manage Pain  Recent Flowsheet Documentation  Taken 2021 0514 by Mehnaz Becerra RN  Pain Management Interventions: relaxation techniques promoted  Taken 2021 0400 by Mehnaz Becerra RN  Pain Management Interventions: relaxation techniques promoted  Taken 2021 0300 by Mehnaz Becerra RN  Pain Management Interventions: relaxation techniques promoted  Taken 2021 0200 by Mehnaz Becerra RN  Pain Management Interventions: relaxation techniques promoted     Problem: Respiratory Compromise (Anesthesia/Analgesia, Neuraxial)  Goal: Effective Oxygenation and Ventilation  Outcome: Ongoing, Progressing     Problem: Sensorimotor Impairment (Anesthesia/Analgesia, Neuraxial)  Goal: Baseline Motor Function  Outcome: Ongoing, Progressing  Intervention: Optimize Sensorimotor Function  Recent Flowsheet Documentation  Taken 2021 0514 by Mehnaz Becerra RN  Safety Promotion/Fall Prevention: safety round/check completed  Taken 2021 0400 by Mehnaz Becerra RN  Safety Promotion/Fall  Prevention: safety round/check completed  Taken 2021 0300 by Mehnaz Becerra RN  Safety Promotion/Fall Prevention: safety round/check completed  Taken 2021 0200 by Mehnaz Becerra RN  Safety Promotion/Fall Prevention: safety round/check completed     Problem: Urinary Retention (Anesthesia/Analgesia, Neuraxial)  Goal: Effective Urinary Elimination  Outcome: Ongoing, Progressing     Problem: Adjustment to Role Transition (Postpartum  Delivery)  Goal: Successful Maternal Role Transition  Outcome: Ongoing, Progressing     Problem: Bleeding (Postpartum  Delivery)  Goal: Hemostasis  Outcome: Ongoing, Progressing     Problem: Infection (Postpartum  Delivery)  Goal: Absence of Infection Signs and Symptoms  Outcome: Ongoing, Progressing     Problem: Pain (Postpartum  Delivery)  Goal: Acceptable Pain Control  Outcome: Ongoing, Progressing  Intervention: Prevent or Manage Pain  Recent Flowsheet Documentation  Taken 2021 0514 by Mehnaz Becerra RN  Pain Management Interventions: relaxation techniques promoted  Taken 2021 0400 by Mehnaz Becerra RN  Pain Management Interventions: relaxation techniques promoted  Taken 2021 0300 by Mehnaz Becerra RN  Pain Management Interventions: relaxation techniques promoted  Taken 2021 0200 by Mehnaz Becerra RN  Pain Management Interventions: relaxation techniques promoted     Problem: Postoperative Nausea and Vomiting (Postpartum  Delivery)  Goal: Nausea and Vomiting Relief  Outcome: Ongoing, Progressing     Problem: Postoperative Urinary Retention (Postpartum  Delivery)  Goal: Effective Urinary Elimination  Outcome: Ongoing, Progressing     Problem:  Fall Injury Risk  Goal: Absence of Fall, Infant Drop and Related Injury  Outcome: Ongoing, Progressing  Intervention: Promote Injury-Free Environment  Recent Flowsheet Documentation  Taken 2021 0514 by Mehnaz Becerra RN  Safety  Promotion/Fall Prevention: safety round/check completed  Taken 4/19/2021 0400 by Mehnaz Becerra, RN  Safety Promotion/Fall Prevention: safety round/check completed  Taken 4/19/2021 0300 by Mehnaz Becerra, RN  Safety Promotion/Fall Prevention: safety round/check completed  Taken 4/19/2021 0200 by Mehnaz Becerra, RN  Safety Promotion/Fall Prevention: safety round/check completed   Goal Outcome Evaluation:     Progress: improving  Outcome Summary: VSS, FFML U1 with scant bleeding.  ALT incision with Dermabond and is C/D/I with no drainage noted.  Pt ambulating frequently in room, voiding without difficulty and passing gas.  Bonding well with baby.  Pain medications given x 1 with good relief.  Pt would like to go home today.

## 2021-04-19 NOTE — PROGRESS NOTES
[facility]   PROGRESS NOTE    Post-Op Day 2 S/P   Subjective     Patient reports:  Pain is well controlled with prescription NSAID's including ibuprofen (Motrin) and narcotic analgesics including Percocet.  She is ambulating. Tolerating diet. Voiding - without difficulty; flatus reported..  Vaginal bleeding is as much as expected.      Objective      Vitals: Vital Signs Range for the last 24 hours  Temperature: Temp:  [97.3 °F (36.3 °C)-98.4 °F (36.9 °C)] 98 °F (36.7 °C)   Temp Source: Temp src: Temporal   BP: BP: (139-152)/(78-86) 146/78   Pulse: Heart Rate:  [92-97] 95   Respirations: Resp:  [18] 18   SPO2: SpO2:  [97 %-100 %] 97 %   O2 Amount (l/min):     O2 Devices Device (Oxygen Therapy): room air   Weight:              Physical Exam    Lungs normal effort   Abdomen Soft, non-tender, normal bowel sounds; no bruits, organomegaly or masses.   Incision  healing well, no drainage, no erythema, no seroma, no swelling, well approximated   Extremities extremities normal, atraumatic, no cyanosis or edema     I reviewed the patient's new clinical results.  Lab Results (last 24 hours)     ** No results found for the last 24 hours. **          Assessment/Plan        Pregnancy    Normal labor      Assessment:    Maya Fields is Day 2  post-partum  , Low Transverse   .       Plan:  plan for discharge today.        Krista Wayne DO  2021  08:01 CDT

## 2021-04-19 NOTE — DISCHARGE SUMMARY
Discharge Summary     Anna Fields  : 1990  MRN: [id number]  CSN: [id number]    Date of Admission: 2021   Date of Discharge:  2021   Delivering Physician: Krista Wayne DO       Admission Diagnosis: 1. Pregnancy [Z34.90]  2. Normal labor [O80, Z37.9]   Discharge Diagnosis: 1. Pregnancy at 40w1d - delivered       Procedures: 1. Primary  (LTCS)     Hospital Course  See the completed operative report for details regarding antepartum course and delivery.    Her post-operative course was unremarkable.  On POD # 2 she felt like she ready for discharge.  She was evaluated by Dr. Wayne who agreed she was able to be discharged to home.  She had no febrile morbidity. She had normal bowel and bladder function and was hemodynamically stable.  Her wound was healing well without obvious signs of infections.    Infant  male  fetus weighing 3500 g (7 lb 11.5 oz)   Apgars -  8 @ 1 minute /  8 @ 5 minutes.    Discharge labs  Lab Results   Component Value Date    WBC 16.80 (H) 2021    HGB 7.6 (L) 2021    HCT 25.3 (L) 2021     2021       Discharge Medications     Discharge Medications      New Medications      Instructions Start Date   oxyCODONE-acetaminophen  MG per tablet  Commonly known as: PERCOCET   1 tablet, Oral, Every 4 Hours PRN         Continue These Medications      Instructions Start Date   ferrous sulfate 325 (65 FE) MG tablet   325 mg, Oral, Daily With Breakfast      folic acid 1 MG tablet  Commonly known as: FOLVITE   1 mg, Oral, Daily      lansoprazole 15 MG capsule  Commonly known as: PREVACID   15 mg, Oral      mesalamine 0.375 g 24 hr capsule  Commonly known as: APRISO   0.375 g, Oral, Daily      metoclopramide 10 MG tablet  Commonly known as: REGLAN   10 mg, Oral, 3 Times Daily PRN      prenatal vitamin 27-0.8 27-0.8 MG tablet tablet   Oral, Daily      Vitamin A 3 MG (71670 UT) capsule   10,000 Units, Oral, Daily      vitamin  B-12 100 MCG tablet  Commonly known as: CYANOCOBALAMIN   50 mcg, Oral, Daily      vitamin D3 125 MCG (5000 UT) capsule capsule   5,000 Units, Oral, Daily             Discharge Disposition Home or Self Care   Condition on Discharge: good   Follow-up: 2 weeks with Larry Wayne DO  4/19/2021

## 2021-04-21 LAB
CYTO UR: NORMAL
LAB AP CASE REPORT: NORMAL
LAB AP CLINICAL INFORMATION: NORMAL
PATH REPORT.FINAL DX SPEC: NORMAL
PATH REPORT.GROSS SPEC: NORMAL

## 2021-05-04 ENCOUNTER — POSTPARTUM VISIT (OUTPATIENT)
Dept: OBSTETRICS AND GYNECOLOGY | Facility: CLINIC | Age: 31
End: 2021-05-04

## 2021-05-04 VITALS
WEIGHT: 154 LBS | SYSTOLIC BLOOD PRESSURE: 122 MMHG | BODY MASS INDEX: 26.29 KG/M2 | DIASTOLIC BLOOD PRESSURE: 84 MMHG | HEIGHT: 64 IN

## 2021-05-04 DIAGNOSIS — Z09 POSTOP CHECK: Primary | ICD-10-CM

## 2021-05-04 PROBLEM — Z34.90 PREGNANCY: Status: RESOLVED | Noted: 2021-04-17 | Resolved: 2021-05-04

## 2021-05-04 PROBLEM — Z37.9 NORMAL LABOR: Status: RESOLVED | Noted: 2021-04-17 | Resolved: 2021-05-04

## 2021-05-04 PROCEDURE — 99024 POSTOP FOLLOW-UP VISIT: CPT | Performed by: OBSTETRICS & GYNECOLOGY

## 2021-05-04 NOTE — PROGRESS NOTES
"Maya Fields is a 30 y.o. female here today for incision check after undergoing  on .  She has been doing well since her discharge from the hospital and denies fevers, significant abdominal pain, nausea, or problems with her incision.  Her infant is formula-feeding well and she denies postpartum blues.    /84 (BP Location: Left arm, Patient Position: Sitting)   Ht 162.6 cm (64\")   Wt 69.9 kg (154 lb)   LMP 2020 (Approximate)   BMI 26.43 kg/m²   In general pleasant female no acute distress  Abdomen soft and nontender  Her incision is healing well without signs of infection    Assessment: Normal incision check after a     She will continue with light activities and pelvic rest.  She will followup in 4 weeks for a postpartum visit and call in the meantime if she has any questions or concerns.   "

## 2021-06-01 ENCOUNTER — POSTPARTUM VISIT (OUTPATIENT)
Dept: OBSTETRICS AND GYNECOLOGY | Facility: CLINIC | Age: 31
End: 2021-06-01

## 2021-06-01 VITALS
WEIGHT: 154 LBS | HEIGHT: 64 IN | SYSTOLIC BLOOD PRESSURE: 110 MMHG | DIASTOLIC BLOOD PRESSURE: 82 MMHG | BODY MASS INDEX: 26.29 KG/M2

## 2021-06-01 PROBLEM — Z34.90 SUPERVISION OF NORMAL PREGNANCY: Status: RESOLVED | Noted: 2020-09-14 | Resolved: 2021-06-01

## 2021-06-01 PROCEDURE — 0503F POSTPARTUM CARE VISIT: CPT | Performed by: OBSTETRICS & GYNECOLOGY

## 2021-06-01 NOTE — PROGRESS NOTES
"Maya Fields is here for a postpartum visit after a  6 weeks ago.  The depression questionnaire has been completed.  She has no signs of postpartum depression today.  She has had a period since her delivery and is formula-feeding her infant without difficulty.  Her last Pap smear was 3/2018 and normal.  She has no history of cervical dysplasia.  She is unsure of what she would like for contraception.    /82 (BP Location: Left arm, Patient Position: Sitting)   Ht 162.6 cm (64\")   Wt 69.9 kg (154 lb)   LMP 2021 (Approximate)   Breastfeeding No   BMI 26.43 kg/m²    In general pleasant female no acute distress  Neck no thyromegaly  Breasts without erythema tenderness or masses  Abdomen soft and nontender, the incision is well healed  A Pap smear was not performed.     Assessment: Normal postpartum exam.    We have discussed current Pap smear screening guidelines. I have given her a handout for a Mirena IUD. Maya will return in 6-8 weeks for annual exam and Pap smear. She will contact the office if she desires hormonal contraception.  "

## 2023-11-06 ENCOUNTER — APPOINTMENT (OUTPATIENT)
Dept: GENERAL RADIOLOGY | Facility: HOSPITAL | Age: 33
End: 2023-11-06

## 2023-11-06 ENCOUNTER — HOSPITAL ENCOUNTER (EMERGENCY)
Facility: HOSPITAL | Age: 33
Discharge: HOME | End: 2023-11-06
Attending: EMERGENCY MEDICINE | Admitting: EMERGENCY MEDICINE

## 2023-11-06 ENCOUNTER — HOSPITAL ENCOUNTER (EMERGENCY)
Dept: CARDIOLOGY | Facility: HOSPITAL | Age: 33
Discharge: HOME | End: 2023-11-06

## 2023-11-06 VITALS
RESPIRATION RATE: 18 BRPM | BODY MASS INDEX: 22.2 KG/M2 | HEART RATE: 57 BPM | OXYGEN SATURATION: 100 % | HEIGHT: 64 IN | SYSTOLIC BLOOD PRESSURE: 125 MMHG | DIASTOLIC BLOOD PRESSURE: 74 MMHG | WEIGHT: 130 LBS | TEMPERATURE: 98.2 F

## 2023-11-06 DIAGNOSIS — R07.9 CHEST PAIN, UNSPECIFIED TYPE: ICD-10-CM

## 2023-11-06 DIAGNOSIS — R42 DIZZINESS: ICD-10-CM

## 2023-11-06 DIAGNOSIS — R00.2 PALPITATIONS: ICD-10-CM

## 2023-11-06 DIAGNOSIS — R42 DIZZINESS: Primary | ICD-10-CM

## 2023-11-06 LAB
ALBUMIN SERPL-MCNC: 4.3 G/DL (ref 3.5–5.2)
ALBUMIN/GLOB SERPL: 1.4 G/DL
ALP SERPL-CCNC: 59 U/L (ref 39–117)
ALT SERPL W P-5'-P-CCNC: 8 U/L (ref 1–33)
ANION GAP SERPL CALCULATED.3IONS-SCNC: 11 MMOL/L (ref 5–15)
AST SERPL-CCNC: 12 U/L (ref 1–32)
B-HCG UR QL: NEGATIVE
BASOPHILS # BLD AUTO: 0.04 10*3/MM3 (ref 0–0.2)
BASOPHILS NFR BLD AUTO: 0.4 % (ref 0–1.5)
BILIRUB SERPL-MCNC: 0.4 MG/DL (ref 0–1.2)
BUN SERPL-MCNC: 15 MG/DL (ref 6–20)
BUN/CREAT SERPL: 15 (ref 7–25)
CALCIUM SPEC-SCNC: 9.2 MG/DL (ref 8.6–10.5)
CHLORIDE SERPL-SCNC: 107 MMOL/L (ref 98–107)
CO2 SERPL-SCNC: 26 MMOL/L (ref 22–29)
CREAT SERPL-MCNC: 1 MG/DL (ref 0.57–1)
D DIMER PPP FEU-MCNC: 0.46 MCGFEU/ML (ref 0–0.5)
DEPRECATED RDW RBC AUTO: 49 FL (ref 37–54)
EGFRCR SERPLBLD CKD-EPI 2021: 75.5 ML/MIN/1.73
EOSINOPHIL # BLD AUTO: 0.03 10*3/MM3 (ref 0–0.4)
EOSINOPHIL NFR BLD AUTO: 0.3 % (ref 0.3–6.2)
ERYTHROCYTE [DISTWIDTH] IN BLOOD BY AUTOMATED COUNT: 15.4 % (ref 12.3–15.4)
EXPIRATION DATE: NORMAL
GEN 5 2HR TROPONIN T REFLEX: <6 NG/L
GLOBULIN UR ELPH-MCNC: 3 GM/DL
GLUCOSE SERPL-MCNC: 96 MG/DL (ref 65–99)
HCT VFR BLD AUTO: 38.3 % (ref 34–46.6)
HGB BLD-MCNC: 12 G/DL (ref 12–15.9)
HOLD SPECIMEN: NORMAL
HOLD SPECIMEN: NORMAL
IMM GRANULOCYTES # BLD AUTO: 0.03 10*3/MM3 (ref 0–0.05)
IMM GRANULOCYTES NFR BLD AUTO: 0.3 % (ref 0–0.5)
INTERNAL NEGATIVE CONTROL: NEGATIVE
INTERNAL POSITIVE CONTROL: POSITIVE
LYMPHOCYTES # BLD AUTO: 1.88 10*3/MM3 (ref 0.7–3.1)
LYMPHOCYTES NFR BLD AUTO: 18.9 % (ref 19.6–45.3)
Lab: NORMAL
MCH RBC QN AUTO: 26.8 PG (ref 26.6–33)
MCHC RBC AUTO-ENTMCNC: 31.3 G/DL (ref 31.5–35.7)
MCV RBC AUTO: 85.7 FL (ref 79–97)
MONOCYTES # BLD AUTO: 0.47 10*3/MM3 (ref 0.1–0.9)
MONOCYTES NFR BLD AUTO: 4.7 % (ref 5–12)
NEUTROPHILS NFR BLD AUTO: 7.48 10*3/MM3 (ref 1.7–7)
NEUTROPHILS NFR BLD AUTO: 75.4 % (ref 42.7–76)
NRBC BLD AUTO-RTO: 0 /100 WBC (ref 0–0.2)
PLATELET # BLD AUTO: 429 10*3/MM3 (ref 140–450)
PMV BLD AUTO: 10.2 FL (ref 6–12)
POTASSIUM SERPL-SCNC: 3.5 MMOL/L (ref 3.5–5.2)
PROT SERPL-MCNC: 7.3 G/DL (ref 6–8.5)
RBC # BLD AUTO: 4.47 10*6/MM3 (ref 3.77–5.28)
SODIUM SERPL-SCNC: 144 MMOL/L (ref 136–145)
TROPONIN T DELTA: NORMAL
TROPONIN T SERPL HS-MCNC: <6 NG/L
WBC NRBC COR # BLD: 9.93 10*3/MM3 (ref 3.4–10.8)
WHOLE BLOOD HOLD COAG: NORMAL
WHOLE BLOOD HOLD SPECIMEN: NORMAL

## 2023-11-06 PROCEDURE — 85379 FIBRIN DEGRADATION QUANT: CPT | Performed by: EMERGENCY MEDICINE

## 2023-11-06 PROCEDURE — 93005 ELECTROCARDIOGRAM TRACING: CPT | Performed by: EMERGENCY MEDICINE

## 2023-11-06 PROCEDURE — 71045 X-RAY EXAM CHEST 1 VIEW: CPT

## 2023-11-06 PROCEDURE — 99284 EMERGENCY DEPT VISIT MOD MDM: CPT

## 2023-11-06 PROCEDURE — 25810000003 SODIUM CHLORIDE 0.9 % SOLUTION: Performed by: EMERGENCY MEDICINE

## 2023-11-06 PROCEDURE — 84484 ASSAY OF TROPONIN QUANT: CPT | Performed by: EMERGENCY MEDICINE

## 2023-11-06 PROCEDURE — 81025 URINE PREGNANCY TEST: CPT | Performed by: EMERGENCY MEDICINE

## 2023-11-06 PROCEDURE — 93246 EXT ECG>7D<15D RECORDING: CPT

## 2023-11-06 PROCEDURE — 80053 COMPREHEN METABOLIC PANEL: CPT | Performed by: EMERGENCY MEDICINE

## 2023-11-06 PROCEDURE — 36415 COLL VENOUS BLD VENIPUNCTURE: CPT

## 2023-11-06 PROCEDURE — 85025 COMPLETE CBC W/AUTO DIFF WBC: CPT | Performed by: EMERGENCY MEDICINE

## 2023-11-06 RX ORDER — ASPIRIN 81 MG/1
324 TABLET, CHEWABLE ORAL ONCE
Status: COMPLETED | OUTPATIENT
Start: 2023-11-06 | End: 2023-11-06

## 2023-11-06 RX ORDER — SODIUM CHLORIDE 0.9 % (FLUSH) 0.9 %
10 SYRINGE (ML) INJECTION AS NEEDED
Status: DISCONTINUED | OUTPATIENT
Start: 2023-11-06 | End: 2023-11-06 | Stop reason: HOSPADM

## 2023-11-06 RX ADMIN — SODIUM CHLORIDE 500 ML: 9 INJECTION, SOLUTION INTRAVENOUS at 10:41

## 2023-11-06 RX ADMIN — ASPIRIN 243 MG: 81 TABLET, CHEWABLE ORAL at 10:39

## 2023-11-06 NOTE — ED PROVIDER NOTES
Subjective   History of Present Illness  Patient is a 33-year-old female with a history of ulcerative colitis and GERD who presents to the ER with dizziness.  Patient states she was driving yesterday morning when she had the sudden onset of feeling dizzy and lightheaded.  Patient states she also developed palpitations.  Patient stated it felt like her heart was racing.  This lasted for a few minutes and then her symptoms resolved.  She denies syncope.  Patient states she went home and just felt fatigued all day.  Patient states she still felt lightheaded today and then around 830 this morning developed left-sided chest pain.  She describes it as an aching and burning type pain with radiation to the left arm.  Symptoms have been constant since onset.  She denies any fever, shortness of air, abdominal pain, nausea vomiting diarrhea, urinary changes, focal neurologic changes, cough, leg pain or swelling.      Review of Systems   Constitutional:  Positive for fatigue.   HENT: Negative.     Eyes: Negative.    Respiratory: Negative.     Cardiovascular:  Positive for chest pain and palpitations.   Gastrointestinal: Negative.    Endocrine: Negative.    Genitourinary: Negative.    Musculoskeletal: Negative.    Skin: Negative.    Allergic/Immunologic: Negative.    Neurological:  Positive for dizziness and light-headedness.   Hematological: Negative.    Psychiatric/Behavioral: Negative.     All other systems reviewed and are negative.      Past Medical History:   Diagnosis Date    Anemia     GERD (gastroesophageal reflux disease)     Nephrolithiasis     UC (ulcerative colitis)     Diagnosis , pancolitis       Allergies   Allergen Reactions    Zofran [Ondansetron Hcl] Palpitations       Past Surgical History:   Procedure Laterality Date     SECTION N/A 2021    Procedure:  SECTION PRIMARY;  Surgeon: Krista Wayne DO;  Location: [facility] LABOR DELIVERY;  Service: Obstetrics;  Laterality: N/A;     CHOLECYSTECTOMY      COLONOSCOPY      COLONOSCOPY Left 10/17/2016    Procedure: COLONOSCOPY WITH ANESTHESIA;  Surgeon: Jordan Mcdaniel MD;  Location: [facility] ENDOSCOPY;  Service:        Family History   Problem Relation Age of Onset    Crohn's disease Cousin     Colon cancer Maternal Grandfather     Esophageal cancer Neg Hx        Social History     Socioeconomic History    Marital status: Single   Tobacco Use    Smoking status: Former    Smokeless tobacco: Never   Substance and Sexual Activity    Alcohol use: No    Drug use: No    Sexual activity: Yes     Partners: Male     Birth control/protection: None           Objective   Physical Exam  Vitals and nursing note reviewed.   Constitutional:       Appearance: She is well-developed.   HENT:      Head: Normocephalic and atraumatic.   Eyes:      Conjunctiva/sclera: Conjunctivae normal.      Pupils: Pupils are equal, round, and reactive to light.   Cardiovascular:      Rate and Rhythm: Normal rate and regular rhythm.      Heart sounds: Normal heart sounds.   Pulmonary:      Effort: Pulmonary effort is normal.      Breath sounds: Normal breath sounds.   Abdominal:      Palpations: Abdomen is soft.      Tenderness: There is no abdominal tenderness.   Musculoskeletal:         General: No deformity. Normal range of motion.      Cervical back: Normal range of motion.   Skin:     General: Skin is warm.   Neurological:      Mental Status: She is alert and oriented to person, place, and time.      Cranial Nerves: Cranial nerves 2-12 are intact.      Sensory: Sensation is intact.      Motor: Motor function is intact.   Psychiatric:         Behavior: Behavior normal.         Procedures           ED Course      EKG as interpreted by me: Sinus bradycardia with a rate of 55 with a sinus arrhythmia, no acute ischemia or infarction  EKG 2 as interpreted by me: Normal sinus rhythm with a rate of 69 with a sinus arrhythmia, no acute ischemia or infarction    Orthostatics: wnl          Lab Results (last 24 hours)       Procedure Component Value Units Date/Time    CBC & Differential [[id number]]  (Abnormal) Collected: 11/06/23 1032    Specimen: Blood Updated: 11/06/23 1109    Narrative:      The following orders were created for panel order CBC & Differential.  Procedure                               Abnormality         Status                     ---------                               -----------         ------                     CBC Auto Differential[[id number]]        Abnormal            Final result                 Please view results for these tests on the individual orders.    Comprehensive Metabolic Panel [[id number]] Collected: 11/06/23 1032    Specimen: Blood Updated: 11/06/23 1128     Glucose 96 mg/dL      BUN 15 mg/dL      Creatinine 1.00 mg/dL      Sodium 144 mmol/L      Potassium 3.5 mmol/L      Chloride 107 mmol/L      CO2 26.0 mmol/L      Calcium 9.2 mg/dL      Total Protein 7.3 g/dL      Albumin 4.3 g/dL      ALT (SGPT) 8 U/L      AST (SGOT) 12 U/L      Alkaline Phosphatase 59 U/L      Total Bilirubin 0.4 mg/dL      Globulin 3.0 gm/dL      A/G Ratio 1.4 g/dL      BUN/Creatinine Ratio 15.0     Anion Gap 11.0 mmol/L      eGFR 75.5 mL/min/1.73     Narrative:      GFR Normal >60  Chronic Kidney Disease <60  Kidney Failure <15      High Sensitivity Troponin T [[id number]]  (Normal) Collected: 11/06/23 1032    Specimen: Blood Updated: 11/06/23 1126     HS Troponin T <6 ng/L     Narrative:      High Sensitive Troponin T Reference Range:  <10.0 ng/L- Negative Female for AMI  <15.0 ng/L- Negative Male for AMI  >=10 - Abnormal Female indicating possible myocardial injury.  >=15 - Abnormal Male indicating possible myocardial injury.   Clinicians would have to utilize clinical acumen, EKG, Troponin, and serial changes to determine if it is an Acute Myocardial Infarction or myocardial injury due to an underlying chronic condition.         D-dimer, Quantitative [[id number]]  (Normal) Collected:  "11/06/23 1032    Specimen: Blood Updated: 11/06/23 1120     D-Dimer, Quantitative 0.46 MCGFEU/mL     Narrative:      According to the assay 's published package insert, a normal (<0.50 MCGFEU/mL) D-dimer result in conjunction with a non-high clinical probability assessment, excludes deep vein thrombosis (DVT) and pulmonary embolism (PE) with high sensitivity.    D-dimer values increase with age and this can make VTE exclusion of an older population difficult. To address this, the [employer] College [employer], based on best available evidence and recent guidelines, recommends that clinicians use age-adjusted D-dimer thresholds in patients greater than 50 years of age with: a) a low probability of PE who do not meet all Pulmonary Embolism Rule Out Criteria, or b) in those with intermediate probability of PE.   The formula for an age-adjusted D-dimer cut-off is \"age/100\".  For example, a 60 year old patient would have an age-adjusted cut-off of 0.60 MCGFEU/mL and an 80 year old 0.80 MCGFEU/mL.    CBC Auto Differential [[id number]]  (Abnormal) Collected: 11/06/23 1032    Specimen: Blood Updated: 11/06/23 1109     WBC 9.93 10*3/mm3      RBC 4.47 10*6/mm3      Hemoglobin 12.0 g/dL      Hematocrit 38.3 %      MCV 85.7 fL      MCH 26.8 pg      MCHC 31.3 g/dL      RDW 15.4 %      RDW-SD 49.0 fl      MPV 10.2 fL      Platelets 429 10*3/mm3      Neutrophil % 75.4 %      Lymphocyte % 18.9 %      Monocyte % 4.7 %      Eosinophil % 0.3 %      Basophil % 0.4 %      Immature Grans % 0.3 %      Neutrophils, Absolute 7.48 10*3/mm3      Lymphocytes, Absolute 1.88 10*3/mm3      Monocytes, Absolute 0.47 10*3/mm3      Eosinophils, Absolute 0.03 10*3/mm3      Basophils, Absolute 0.04 10*3/mm3      Immature Grans, Absolute 0.03 10*3/mm3      nRBC 0.0 /100 WBC     POC Urine Pregnancy [[id number]]  (Normal) Collected: 11/06/23 1136    Specimen: Urine Updated: 11/06/23 1137     HCG, Urine, QL Negative     Lot Number [id number]     " Internal Positive Control Positive     Internal Negative Control Negative     Expiration Date 1,302,146    High Sensitivity Troponin T 2Hr [[id number]] Collected: 11/06/23 1156    Specimen: Blood Updated: 11/06/23 1235     HS Troponin T <6 ng/L      Troponin T Delta --     Comment: Unable to calculate.       Narrative:      High Sensitive Troponin T Reference Range:  <10.0 ng/L- Negative Female for AMI  <15.0 ng/L- Negative Male for AMI  >=10 - Abnormal Female indicating possible myocardial injury.  >=15 - Abnormal Male indicating possible myocardial injury.   Clinicians would have to utilize clinical acumen, EKG, Troponin, and serial changes to determine if it is an Acute Myocardial Infarction or myocardial injury due to an underlying chronic condition.                XR Chest 1 View   Final Result   Impression: No evidence of acute cardiopulmonary disease.       This report was signed and finalized on 11/6/2023 10:45 AM CST by Dr. Fazal Bess MD.                                           Medical Decision Making  Patient is a 33-year-old female with a history of ulcerative colitis and GERD who presents to the ER with dizziness.  Patient states she was driving yesterday morning when she had the sudden onset of feeling dizzy and lightheaded.  Patient states she also developed palpitations.  Patient stated it felt like her heart was racing.  This lasted for a few minutes and then her symptoms resolved.  She denies syncope.  Patient states she went home and just felt fatigued all day.  Patient states she still felt lightheaded today and then around 830 this morning developed left-sided chest pain.  She describes it as an aching and burning type pain with radiation to the left arm.  Symptoms have been constant since onset.  She denies any fever, shortness of air, abdominal pain, nausea vomiting diarrhea, urinary changes, focal neurologic changes, cough, leg pain or swelling.    Differential diagnosis: Acute  coronary syndrome, pulmonary embolus, palpitations, cardiac arrhythmia,    Patient was given IV fluids.  Labs are normal including troponin x2 and D-dimer.  Chest x-ray was negative.  I feel the patient is most likely having an arrhythmia versus PVCs.  Holter monitor has been ordered for the patient.  She will be discharged home to follow-up with her PCP.  She is to return for any worsening or new symptoms or other concerns.  Patient agreeable.    HEART Score for Major Cardiac Events - MDCalc  Calculated on Nov 06 2023 1:52 PM  0 points -> Low Score (0-3 points) Risk of MACE of 0.9-1.7%.    Problems Addressed:  Chest pain, unspecified type: complicated acute illness or injury  Dizziness: complicated acute illness or injury  Palpitations: complicated acute illness or injury    Amount and/or Complexity of Data Reviewed  Labs: ordered. Decision-making details documented in ED Course.  Radiology: ordered. Decision-making details documented in ED Course.  ECG/medicine tests: ordered. Decision-making details documented in ED Course.    Risk  OTC drugs.  Prescription drug management.        Final diagnoses:   Dizziness   Chest pain, unspecified type   Palpitations       ED Disposition  ED Disposition       ED Disposition   Discharge    Condition   Good    Comment   --               Liu Juarez MD  [address] [address]  [phone]    Schedule an appointment as soon as possible for a visit            Medication List      No changes were made to your prescriptions during this visit.            Rosario Tobar MD  11/06/23 7921

## 2023-11-07 LAB
QT INTERVAL: 416 MS
QT INTERVAL: 454 MS
QTC INTERVAL: 434 MS
QTC INTERVAL: 445 MS

## 2023-11-28 ENCOUNTER — NURSE TRIAGE (OUTPATIENT)
Dept: CALL CENTER | Facility: HOSPITAL | Age: 33
End: 2023-11-28

## 2023-11-28 NOTE — TELEPHONE ENCOUNTER
Reason for Disposition   [1] Palpitations AND [2] no improvement after using Care Advice    Additional Information   Negative: Passed out (i.e., lost consciousness, collapsed and was not responding)   Negative: Shock suspected (e.g., cold/pale/clammy skin, too weak to stand, low BP, rapid pulse)   Negative: Difficult to awaken or acting confused (e.g., disoriented, slurred speech)   Negative: Visible sweat on face or sweat dripping down face   Negative: Unable to walk, or can only walk with assistance (e.g., requires support)   Negative: [1] Received SHOCK from implantable cardiac defibrillator AND [2] persisting symptoms (i.e., palpitations, lightheadedness)   Negative: [1] Dizziness, lightheadedness, or weakness AND [2] heart beating very rapidly (e.g., > 140 / minute)   Negative: [1] Dizziness, lightheadedness, or weakness AND [2] heart beating very slowly (e.g., < 50 / minute)   Negative: Sounds like a life-threatening emergency to the triager   Negative: Chest pain   Negative: Implantable Cardiac Defibrillator (ICD) or a pacemaker symptoms or questions   Negative: Difficulty breathing   Negative: Dizziness, lightheadedness, or weakness   Negative: [1] Heart beating very rapidly (e.g., > 140 / minute) AND [2] present now  (Exception: During exercise.)   Negative: Heart beating very slowly (e.g., < 50 / minute)  (Exception: Athlete and heart rate normal for caller.)   Negative: New or worsened shortness of breath with activity (dyspnea on exertion)   Negative: Patient sounds very sick or weak to the triager   Negative: [1] Heart beating very rapidly (e.g., > 140 / minute) AND [2] not present now  (Exception: During exercise.)   Negative: [1] Skipped or extra beat(s) AND [2] increases with exercise or exertion   Negative: [1] Skipped or extra beat(s) AND [2] occurs 4 or more times per minute   Negative: New or worsened ankle swelling   Negative: History of heart disease (i.e., heart attack, bypass surgery, angina,  "angioplasty, CHF)  (Exception: Brief heartbeat symptoms that went away and now feels well.)   Negative: Age > 60 years  (Exception: Brief heartbeat symptoms that went away and now feels well.)   Negative: Taking water pill (i.e., diuretic) or heart medication (e.g., digoxin)   Negative: Wearing a \"Holter monitor\" or \"cardiac event monitor\"   Negative: [1] Received SHOCK from implantable cardiac defibrillator AND [2] now feels well   Negative: Heart rhythm alert (e.g., \"you have irregular heartbeat\") from personal wearable device (e.g., Apple Watch)   Negative: History of hyperthyroidism or taking thyroid medication   Negative: Substance use (drug use) or misuse, known or suspected   Negative: [1] ADHD AND [2] taking stimulant medication    Answer Assessment - Initial Assessment Questions  1. DESCRIPTION: \"Please describe your heart rate or heartbeat that you are having\" (e.g., fast/slow, regular/irregular, skipped or extra beats, \"palpitations\")      irregular  2. ONSET: \"When did it start?\" (Minutes, hours or days)       For 2 weeks or more  3. DURATION: \"How long does it last\" (e.g., seconds, minutes, hours)      minutes  4. PATTERN \"Does it come and go, or has it been constant since it started?\"  \"Does it get worse with exertion?\"   \"Are you feeling it now?\"      Come and go  5. TAP: \"Using your hand, can you tap out what you are feeling on a chair or table in front of you, so that I can hear?\" (Note: not all patients can do this)           6. HEART RATE: \"Can you tell me your heart rate?\" \"How many beats in 15 seconds?\"  (Note: not all patients can do this)        60's to 80's  7. RECURRENT SYMPTOM: \"Have you ever had this before?\" If Yes, ask: \"When was the last time?\" and \"What happened that time?\"       Yes; Holter monitor for the past 2 weeks  8. CAUSE: \"What do you think is causing the palpitations?\"      unknown  9. CARDIAC HISTORY: \"Do you have any history of heart disease?\" (e.g., heart attack, angina, " "bypass surgery, angioplasty, arrhythmia)       denies  10. OTHER SYMPTOMS: \"Do you have any other symptoms?\" (e.g., dizziness, chest pain, sweating, difficulty breathing)        Slight lightheadedness    Protocols used: Heart Rate and Heartbeat Questions-ADULT-    "

## 2023-12-07 ENCOUNTER — NURSE TRIAGE (OUTPATIENT)
Dept: CALL CENTER | Facility: HOSPITAL | Age: 33
End: 2023-12-07

## 2023-12-07 NOTE — TELEPHONE ENCOUNTER
Caller was seen in November in the ER for some heart issues and wore a Holter monitor.  She has a new pt appt coming up with a PCP.  She is asking about a flu vaccine exemption until she sees the doctor as she is still having issues and has not been given a diagnosis yet.  Routing to  to find best way for her to get an exemption for now for her work.

## 2023-12-07 NOTE — TELEPHONE ENCOUNTER
"Reason for Disposition   [1] Caller requesting NON-URGENT health information AND [2] PCP's office is the best resource    Additional Information   Negative: [1] Caller is not with the adult (patient) AND [2] reporting urgent symptoms   Negative: Lab result questions   Negative: Medication questions   Negative: Caller can't be reached by phone   Negative: Caller has already spoken to PCP or another triager   Negative: RN needs further essential information from caller in order to complete triage   Negative: Requesting regular office appointment    Answer Assessment - Initial Assessment Questions  1. REASON FOR CALL or QUESTION: \"What is your reason for calling today?\" or \"How can I best help you?\" or \"What question do you have that I can help answer?\"      How can I get a flu shot exemption/extension until I see the doctor and get a diagnosis about my heart issues?    Protocols used: Information Only Call-ADULT-    "

## 2023-12-18 ENCOUNTER — OFFICE VISIT (OUTPATIENT)
Dept: INTERNAL MEDICINE | Facility: CLINIC | Age: 33
End: 2023-12-18
Payer: MEDICAID

## 2023-12-18 ENCOUNTER — TELEPHONE (OUTPATIENT)
Dept: INTERNAL MEDICINE | Facility: CLINIC | Age: 33
End: 2023-12-18

## 2023-12-18 VITALS
RESPIRATION RATE: 16 BRPM | SYSTOLIC BLOOD PRESSURE: 124 MMHG | OXYGEN SATURATION: 98 % | HEIGHT: 64 IN | DIASTOLIC BLOOD PRESSURE: 83 MMHG | HEART RATE: 68 BPM | WEIGHT: 130.4 LBS | BODY MASS INDEX: 22.26 KG/M2

## 2023-12-18 DIAGNOSIS — R53.83 OTHER FATIGUE: ICD-10-CM

## 2023-12-18 DIAGNOSIS — I20.89 ATYPICAL ANGINA: Primary | ICD-10-CM

## 2023-12-18 DIAGNOSIS — F41.9 ANXIETY: ICD-10-CM

## 2023-12-18 PROBLEM — R11.0 NAUSEA: Status: RESOLVED | Noted: 2020-10-05 | Resolved: 2023-12-18

## 2023-12-18 PROBLEM — R19.7 DIARRHEA: Status: RESOLVED | Noted: 2020-10-05 | Resolved: 2023-12-18

## 2023-12-18 PROBLEM — Z86.16 HISTORY OF 2019 NOVEL CORONAVIRUS DISEASE (COVID-19): Status: RESOLVED | Noted: 2020-11-30 | Resolved: 2023-12-18

## 2023-12-18 PROCEDURE — 99204 OFFICE O/P NEW MOD 45 MIN: CPT | Performed by: INTERNAL MEDICINE

## 2023-12-18 RX ORDER — MAGNESIUM GLUCONATE 27 MG(500)
27 TABLET ORAL 2 TIMES DAILY
COMMUNITY

## 2023-12-18 NOTE — PROGRESS NOTES
CC: Establish care for chest discomfort    History:  Maya Fields is a 33 y.o. female who presents today for evaluation of the above problems.  She notes she has been doing reasonably well, but recently has been having chest discomfort.  She went to the ER in early November and had negative EKG, troponin, and subsequently wore a Holter that was largely benign.  She does have symptoms of palpitations, chest discomfort that radiates to her left arm, associated dyspnea, and a feeling of unwell that occurs intermittently, but in general occurs every couple of weeks.  She feels anxiety has been running high, but she has never required medication for this and does not feel she needs it at this time.    ROS:  Review of Systems   Constitutional:  Negative for chills and fever.   HENT:  Negative for congestion and sore throat.    Eyes:  Negative for visual disturbance.   Respiratory:  Positive for chest tightness and shortness of breath. Negative for cough.    Cardiovascular:  Positive for chest pain and palpitations.   Gastrointestinal:  Negative for abdominal pain, constipation and nausea.   Endocrine: Negative for cold intolerance and heat intolerance.   Genitourinary:  Negative for difficulty urinating and frequency.   Musculoskeletal:  Negative for arthralgias and back pain.   Skin:  Negative for rash.   Neurological:  Negative for dizziness and headaches.   Psychiatric/Behavioral:  Negative for dysphoric mood. The patient is not nervous/anxious.        Allergies   Allergen Reactions    Zofran [Ondansetron Hcl] Palpitations     Past Medical History:   Diagnosis Date    Anemia     GERD (gastroesophageal reflux disease)     Nephrolithiasis     UC (ulcerative colitis)     Diagnosis , pancolitis     Past Surgical History:   Procedure Laterality Date     SECTION N/A 2021    Procedure:  SECTION PRIMARY;  Surgeon: Krista Wayne DO;  Location: [facility] LABOR DELIVERY;  Service: Obstetrics;   "Laterality: N/A;    CHOLECYSTECTOMY      COLONOSCOPY      COLONOSCOPY Left 10/17/2016    Procedure: COLONOSCOPY WITH ANESTHESIA;  Surgeon: Jordan Mcdaniel MD;  Location: [facility] ENDOSCOPY;  Service:      Family History   Problem Relation Age of Onset    Crohn's disease Cousin     Colon cancer Maternal Grandfather     Esophageal cancer Neg Hx       reports that she has quit smoking. She has been exposed to tobacco smoke. She has never used smokeless tobacco. She reports that she does not drink alcohol and does not use drugs.      Current Outpatient Medications:     Cholecalciferol (vitamin D3) 125 MCG (5000 UT) capsule capsule, Take 1 capsule by mouth Daily., Disp: , Rfl:     ferrous sulfate 325 (65 FE) MG tablet, Take 1 tablet by mouth Daily With Breakfast., Disp: , Rfl:     folic acid (FOLVITE) 1 MG tablet, Take 1 tablet by mouth Daily., Disp: , Rfl:     lansoprazole (PREVACID) 15 MG capsule, Take 1 capsule by mouth., Disp: , Rfl:     magnesium gluconate (MAGONATE) 500 MG tablet, Take 1 tablet by mouth 2 (Two) Times a Day., Disp: , Rfl:     Vitamin A 3 MG (74835 UT) capsule, Take 1 capsule by mouth Daily., Disp: , Rfl:     vitamin B-12 (CYANOCOBALAMIN) 100 MCG tablet, Take 0.5 tablets by mouth Daily., Disp: , Rfl:     OBJECTIVE:  /83 (BP Location: Left arm, Patient Position: Sitting, Cuff Size: Adult)   Pulse 68   Resp 16   Ht 162.6 cm (64\")   Wt 59.1 kg (130 lb 6.4 oz)   LMP  (LMP Unknown)   SpO2 98%   BMI 22.38 kg/m²    Physical Exam  Constitutional:       General: She is not in acute distress.     Appearance: She is well-developed.   HENT:      Head: Normocephalic and atraumatic.      Right Ear: Tympanic membrane and external ear normal.      Left Ear: Tympanic membrane and external ear normal.   Eyes:      General: No scleral icterus.     Extraocular Movements: Extraocular movements intact.   Neck:      Trachea: No tracheal deviation.   Cardiovascular:      Rate and Rhythm: Normal rate and regular " rhythm.      Heart sounds: Normal heart sounds. No murmur heard.  Pulmonary:      Effort: Pulmonary effort is normal. No accessory muscle usage or respiratory distress.      Breath sounds: Normal breath sounds. No wheezing.   Abdominal:      General: There is no distension.      Palpations: Abdomen is soft.      Tenderness: There is no abdominal tenderness.   Musculoskeletal:         General: Normal range of motion.      Cervical back: Normal range of motion and neck supple.      Right lower leg: No edema.      Left lower leg: No edema.   Skin:     General: Skin is warm and dry.      Nails: There is no clubbing.   Neurological:      Mental Status: She is alert and oriented to person, place, and time.      Coordination: Coordination normal.      Gait: Gait normal.   Psychiatric:         Mood and Affect: Mood normal. Mood is not anxious or depressed.         Behavior: Behavior normal.     High Sensitivity Troponin T (11/06/2023 10:32)    High Sensitivity Troponin T 2Hr (11/06/2023 11:56)    D-dimer, Quantitative (11/06/2023 10:32)    CBC & Differential (11/06/2023 10:32)    XR Chest 1 View (11/06/2023 10:21)    ECG 12 Lead Chest Pain (11/06/2023 11:51)    Holter Monitor - 72 Hour Up To 15 Days (11/06/2023 13:45)    Assessment/Plan    Diagnoses and all orders for this visit:    1. Atypical angina (Primary)  -     Treadmill Stress Test; Future  -     Lipid Panel; Future  Labs and imaging from ER are reviewed.  There is no suggestion of an overt cardiac process and she certainly has atypical symptoms.  However, we will complete cardiac workup with a treadmill stress test.  If negative, we would further consider alternative etiologies such as anxiety.    2. Other fatigue  -     TSH; Future  Check TSH given cardiac and fatigue symptoms alongside anxiety.     3. Anxiety  Consider this as a possible etiology. Given only intermittent symptoms that do not last long, we will forego pharmacologic therapy at this time.         An  After Visit Summary was printed and given to the patient at discharge.  Return in about 3 months (around 3/18/2024) for Annual physical with Chris with PAP.         Clif Bautista D.O. 12/18/2023   Electronically signed.

## 2023-12-18 NOTE — TELEPHONE ENCOUNTER
Caller: Donnie Mayaheaven Rodrigues     Relationship: SELF      Best call back number: [phone] (Mobile)     What is your medical concern?  THE PATIENT STATES THAT SHE IS NOT SLEEPING OR HAS BROKEN SLEEP.    How long has this issue been going on?  THE PATIENT STATES ABOUT A MONTH AND A HALF AGO    Is your provider already aware of this issue?  THE PATIENT STATES THAT SHE FORGOT TO MENTION THIS AT HER APPOINTMENT    Have you been treated for this issue? NO, THE PATIENT STATES THAT SHE HAS NOT TREATED THE SLEEPING ISSUE AND STATES THAT SHE WOULD LIKE TO KNOW WHAT DR. ALBRECHT SUGGESTS.

## 2023-12-19 NOTE — TELEPHONE ENCOUNTER
Starting out with Melatonin or Benadryl OTC are the first couple of steps. Further steps probably depend on the possible source of the sleep issue. If related to anxiety, treating the anxiety would be ideal, but if not related, then we could consider other options.

## 2024-01-29 ENCOUNTER — TELEPHONE (OUTPATIENT)
Dept: INTERNAL MEDICINE | Facility: CLINIC | Age: 34
End: 2024-01-29
Payer: MEDICAID

## 2024-01-29 NOTE — TELEPHONE ENCOUNTER
Caller:     Donnie Mayaheaven HernandezLilia       Relationship: SELF     Best call back number:     [phone]        What medication are you requesting: SHE STATES SOMETHING FOR HER RIGHT EYE SHE STATES IT IS PINK EYE     What are your current symptoms: PAIN IN EYE REDNESS SWOLLEN AND MUCOUS     How long have you been experiencing symptoms:  ONE DAY    Have you had these symptoms before:    [] Yes  [x] No    Have you been treated for these symptoms before:   [] Yes  [x] No    If a prescription is needed, what is your preferred pharmacy and phone number:  [facility] [address] [place] [address] [address] [facility] - [phone] [facility] [phone]  [phone]     Additional notes: NONE

## 2024-01-30 RX ORDER — CIPROFLOXACIN HYDROCHLORIDE 3.5 MG/ML
1 SOLUTION/ DROPS TOPICAL 4 TIMES DAILY
Qty: 5 ML | Refills: 0 | Status: SHIPPED | OUTPATIENT
Start: 2024-01-30

## 2024-02-01 ENCOUNTER — HOSPITAL ENCOUNTER (OUTPATIENT)
Dept: CARDIOLOGY | Facility: HOSPITAL | Age: 34
Discharge: HOME | End: 2024-02-01
Admitting: INTERNAL MEDICINE
Payer: COMMERCIAL

## 2024-02-01 VITALS
HEIGHT: 64 IN | BODY MASS INDEX: 22.2 KG/M2 | HEART RATE: 80 BPM | DIASTOLIC BLOOD PRESSURE: 66 MMHG | WEIGHT: 130 LBS | SYSTOLIC BLOOD PRESSURE: 131 MMHG

## 2024-02-01 DIAGNOSIS — I20.89 ATYPICAL ANGINA: ICD-10-CM

## 2024-02-01 PROCEDURE — 93017 CV STRESS TEST TRACING ONLY: CPT

## 2024-02-02 LAB
BH CV STRESS BP STAGE 1: NORMAL
BH CV STRESS BP STAGE 2: NORMAL
BH CV STRESS BP STAGE 3: NORMAL
BH CV STRESS BP STAGE 4: NORMAL
BH CV STRESS DURATION MIN STAGE 1: 3
BH CV STRESS DURATION MIN STAGE 2: 3
BH CV STRESS DURATION MIN STAGE 3: 3
BH CV STRESS DURATION MIN STAGE 4: 3
BH CV STRESS DURATION SEC STAGE 1: 0
BH CV STRESS DURATION SEC STAGE 2: 0
BH CV STRESS DURATION SEC STAGE 3: 0
BH CV STRESS DURATION SEC STAGE 4: 0
BH CV STRESS GRADE STAGE 1: 10
BH CV STRESS GRADE STAGE 2: 12
BH CV STRESS GRADE STAGE 3: 14
BH CV STRESS GRADE STAGE 4: 16
BH CV STRESS HR STAGE 1: 99
BH CV STRESS HR STAGE 2: 134
BH CV STRESS HR STAGE 3: 137
BH CV STRESS HR STAGE 4: 162
BH CV STRESS METS STAGE 1: 5
BH CV STRESS METS STAGE 2: 7.5
BH CV STRESS METS STAGE 3: 10
BH CV STRESS METS STAGE 4: 13.5
BH CV STRESS PROTOCOL 1: NORMAL
BH CV STRESS RECOVERY BP: NORMAL MMHG
BH CV STRESS RECOVERY HR: 110 BPM
BH CV STRESS SPEED STAGE 1: 1.7
BH CV STRESS SPEED STAGE 2: 2.5
BH CV STRESS SPEED STAGE 3: 3.4
BH CV STRESS SPEED STAGE 4: 4.2
BH CV STRESS STAGE 1: 1
BH CV STRESS STAGE 2: 2
BH CV STRESS STAGE 3: 3
BH CV STRESS STAGE 4: 4
MAXIMAL PREDICTED HEART RATE: 185 BPM
PERCENT MAX PREDICTED HR: 87.57 %
STRESS BASELINE BP: NORMAL MMHG
STRESS BASELINE HR: 80 BPM
STRESS PERCENT HR: 103 %
STRESS POST ESTIMATED WORKLOAD: 13.5 METS
STRESS POST EXERCISE DUR MIN: 12 MIN
STRESS POST EXERCISE DUR SEC: 0 SEC
STRESS POST PEAK BP: NORMAL MMHG
STRESS POST PEAK HR: 162 BPM
STRESS TARGET HR: 157 BPM

## 2024-02-05 ENCOUNTER — TELEPHONE (OUTPATIENT)
Dept: INTERNAL MEDICINE | Facility: CLINIC | Age: 34
End: 2024-02-05

## 2024-02-05 DIAGNOSIS — Z12.31 ENCOUNTER FOR SCREENING MAMMOGRAM FOR MALIGNANT NEOPLASM OF BREAST: Primary | ICD-10-CM

## 2024-02-05 NOTE — TELEPHONE ENCOUNTER
Reports pain under arm,shoulder blade and front part of chest just on left side. Mentions treadmill stress test was normal. Wanting mammogram to rule out anything due to other testing being normal.

## 2024-02-05 NOTE — TELEPHONE ENCOUNTER
Patient reports seeing Dr. Bautista in December regarding chest discomfort. She does not have a lump but trying to rule out this I am assuming.

## 2024-02-05 NOTE — TELEPHONE ENCOUNTER
Caller: Maya Fields    Relationship: Self    Best call back number: [phone]     What orders are you requesting (i.e. lab or imaging): MAMMOGRAM    In what timeframe would the patient need to come in: ASAP    Where will you receive your lab/imaging services: [facility]    Additional notes:

## 2024-02-13 ENCOUNTER — OFFICE VISIT (OUTPATIENT)
Dept: OBSTETRICS AND GYNECOLOGY | Age: 34
End: 2024-02-13
Payer: COMMERCIAL

## 2024-02-13 VITALS
RESPIRATION RATE: 18 BRPM | BODY MASS INDEX: 23.56 KG/M2 | HEIGHT: 64 IN | DIASTOLIC BLOOD PRESSURE: 88 MMHG | SYSTOLIC BLOOD PRESSURE: 130 MMHG | WEIGHT: 138 LBS

## 2024-02-13 DIAGNOSIS — R07.89 CHEST WALL PAIN: ICD-10-CM

## 2024-02-13 DIAGNOSIS — Z12.31 ENCOUNTER FOR SCREENING MAMMOGRAM FOR MALIGNANT NEOPLASM OF BREAST: ICD-10-CM

## 2024-02-13 DIAGNOSIS — Z12.4 SCREENING FOR CERVICAL CANCER: ICD-10-CM

## 2024-02-13 DIAGNOSIS — Z01.419 WOMEN'S ANNUAL ROUTINE GYNECOLOGICAL EXAMINATION: Primary | ICD-10-CM

## 2024-02-13 PROCEDURE — 87624 HPV HI-RISK TYP POOLED RSLT: CPT | Performed by: NURSE PRACTITIONER

## 2024-02-13 PROCEDURE — G0123 SCREEN CERV/VAG THIN LAYER: HCPCS | Performed by: NURSE PRACTITIONER

## 2024-02-15 LAB
GEN CATEG CVX/VAG CYTO-IMP: NORMAL
HPV I/H RISK 4 DNA CVX QL PROBE+SIG AMP: NOT DETECTED
LAB AP CASE REPORT: NORMAL
LAB AP GYN ADDITIONAL INFORMATION: NORMAL
LAB AP GYN OTHER FINDINGS: NORMAL
Lab: NORMAL
PATH INTERP SPEC-IMP: NORMAL
STAT OF ADQ CVX/VAG CYTO-IMP: NORMAL

## 2024-02-23 ENCOUNTER — OFFICE VISIT (OUTPATIENT)
Dept: INTERNAL MEDICINE | Facility: CLINIC | Age: 34
End: 2024-02-23
Payer: COMMERCIAL

## 2024-02-23 VITALS
HEART RATE: 74 BPM | OXYGEN SATURATION: 97 % | DIASTOLIC BLOOD PRESSURE: 82 MMHG | BODY MASS INDEX: 24.24 KG/M2 | RESPIRATION RATE: 16 BRPM | HEIGHT: 64 IN | SYSTOLIC BLOOD PRESSURE: 126 MMHG | TEMPERATURE: 98.2 F | WEIGHT: 142 LBS

## 2024-02-23 DIAGNOSIS — F32.A ANXIETY AND DEPRESSION: Primary | ICD-10-CM

## 2024-02-23 DIAGNOSIS — M79.10 MUSCLE TENSION PAIN: ICD-10-CM

## 2024-02-23 DIAGNOSIS — M26.623 BILATERAL TEMPOROMANDIBULAR JOINT PAIN: ICD-10-CM

## 2024-02-23 DIAGNOSIS — F41.9 ANXIETY AND DEPRESSION: Primary | ICD-10-CM

## 2024-02-23 RX ORDER — CYCLOBENZAPRINE HCL 5 MG
5 TABLET ORAL 3 TIMES DAILY PRN
Qty: 90 TABLET | Refills: 0 | Status: SHIPPED | OUTPATIENT
Start: 2024-02-23

## 2024-02-23 NOTE — PROGRESS NOTES
"Chief Complaint  Chest Pain (Sometimes \"sharp pain\", sometimes feels more like \"muscle pain\")    Subjective        Maya Fields presents to [facility]  for evaluation of the above complaint.     History of Present Illness    Above symptoms began in November.  Since that time she has had a negative chest x-ray, negative stress test, negative Holter monitor study.  Says the pain is sharp and stabbing and occurs at random.  It is left-sided and sometimes radiates to the back.  Does report she has been under a lot of stress and has anxiety.  She has been trying to stretch and exercise.  Has never been on medication for anxiety except for short-term lorazepam many years ago.  Does feel very tense.  Feels the muscles in the shoulders are very tight.  Has not taken muscle relaxants.  Does have TMJ.    Has order in the computer for future thyroid test.  Says she did have this elsewhere and TSH was normal.      ROS:  Review of Systems  Constitutional: Negative for chills and fever.  HENT: Negative for congestion and sore throat.    Eyes: Negative for visual disturbance.  Respiratory: Negative for cough and shortness of breath.    Cardiovascular: Negative for palpitations.  Gastrointestinal: Negative for abdominal pain, constipation and nausea.  Endocrine: Negative for cold intolerance and heat intolerance.  Genitourinary: Negative for difficulty urinating and frequency.  Musculoskeletal: Negative for arthralgias and back pain.  Skin: Negative for rash.  Neurological: Negative for dizziness and headaches.  Psychiatric/Behavioral: Negative for dysphoric mood. The patient is not nervous/anxious.      Objective   Vital Signs:  /82 (BP Location: Left arm, Patient Position: Sitting, Cuff Size: Adult)   Pulse 74   Temp 98.2 °F (36.8 °C) (Temporal)   Resp 16   Ht 162.6 cm (64\")   Wt 64.4 kg (142 lb)   SpO2 97%   BMI 24.37 kg/m²   Estimated body mass index is 24.37 kg/m² as " "calculated from the following:    Height as of this encounter: 162.6 cm (64\").    Weight as of this encounter: 64.4 kg (142 lb).           Physical Exam  Vitals reviewed.   Constitutional:       General: She is not in acute distress.     Appearance: Normal appearance. She is not ill-appearing.   Cardiovascular:      Rate and Rhythm: Normal rate and regular rhythm.      Heart sounds: Normal heart sounds. No murmur heard.     No friction rub. No gallop.   Pulmonary:      Effort: Pulmonary effort is normal. No respiratory distress.      Breath sounds: Normal breath sounds. No wheezing.   Musculoskeletal:         General: Normal range of motion.      Cervical back: Normal range of motion and neck supple.   Skin:     General: Skin is warm and dry.      Capillary Refill: Capillary refill takes less than 2 seconds.   Neurological:      General: No focal deficit present.      Mental Status: She is alert and oriented to person, place, and time.   Psychiatric:         Attention and Perception: Attention and perception normal.         Mood and Affect: Affect normal. Mood is anxious.         Speech: Speech normal.         Behavior: Behavior normal.         Thought Content: Thought content normal.         Judgment: Judgment normal.        Result Review :  The following data was reviewed by: MARGOT Mittal on 02/23/2024:  CMP          11/6/2023    10:32   CMP   Glucose 96    BUN 15    Creatinine 1.00    EGFR 75.5    Sodium 144    Potassium 3.5    Chloride 107    Calcium 9.2    Total Protein 7.3    Albumin 4.3    Globulin 3.0    Total Bilirubin 0.4    Alkaline Phosphatase 59    AST (SGOT) 12    ALT (SGPT) 8    Albumin/Globulin Ratio 1.4    BUN/Creatinine Ratio 15.0    Anion Gap 11.0      CBC          11/6/2023    10:32   CBC   WBC 9.93    RBC 4.47    Hemoglobin 12.0    Hematocrit 38.3    MCV 85.7    MCH 26.8    MCHC 31.3    RDW 15.4    Platelets 429            Data reviewed : Radiologic studies as below:    Treadmill " Stress Test (02/01/2024 09:41)   Holter Monitor - 72 Hour Up To 15 Days (11/06/2023 13:45)     ECG 12 Lead Chest Pain (11/06/2023 11:51)    XR Chest 1 View (11/06/2023 10:21)       Assessment and Plan   Diagnoses and all orders for this visit:    1. Anxiety and depression (Primary)  Assessment & Plan:  Patient's depression is a recurrent episode that is mild without psychosis. Depression is active and newly identified.    Plan:   Begin new antidepressant medicine; Zoloft    Followup  6 weeks .     I advised that it will take 3-4 weeks to see some effect and 6-8 weeks to see full effect.  If the dose is ineffective or suboptimal, the patient should notify the clinic for a consideration of a dose increase. The patient denied SI/HI, but was advised that starting this medication could worsen these symptoms. We discussed this as an emergency and reason to seek care immediately. The patient expressed understanding.      Orders:  -     sertraline (Zoloft) 50 MG tablet; Take 1 tablet by mouth Daily.  Dispense: 30 tablet; Refill: 1    2. Muscle tension pain  Comments:  As needed Flexeril.  Over-the-counter magnesium.  Stretches.    3. Bilateral temporomandibular joint pain  Comments:  Flexeril as needed.    Other orders  -     cyclobenzaprine (FLEXERIL) 5 MG tablet; Take 1 tablet by mouth 3 (Three) Times a Day As Needed for Muscle Spasms.  Dispense: 90 tablet; Refill: 0           I spent 30 minutes caring for Maya on this date of service. This time includes time spent by me in the following activities:preparing for the visit, reviewing tests, obtaining and/or reviewing a separately obtained history, performing a medically appropriate examination and/or evaluation , counseling and educating the patient/family/caregiver, ordering medications, tests, or procedures, and documenting information in the medical record  Follow Up   Return in about 6 weeks (around 4/5/2024) for Recheck.  Patient was given instructions and counseling  regarding her condition or for health maintenance advice. Please see specific information pulled into the AVS if appropriate.

## 2024-02-23 NOTE — ASSESSMENT & PLAN NOTE
Patient's depression is a recurrent episode that is mild without psychosis. Depression is active and newly identified.    Plan:   Begin new antidepressant medicine; Zoloft    Followup  6 weeks .     I advised that it will take 3-4 weeks to see some effect and 6-8 weeks to see full effect.  If the dose is ineffective or suboptimal, the patient should notify the clinic for a consideration of a dose increase. The patient denied SI/HI, but was advised that starting this medication could worsen these symptoms. We discussed this as an emergency and reason to seek care immediately. The patient expressed understanding.

## 2024-04-05 ENCOUNTER — OFFICE VISIT (OUTPATIENT)
Dept: INTERNAL MEDICINE | Facility: CLINIC | Age: 34
End: 2024-04-05
Payer: COMMERCIAL

## 2024-04-05 VITALS
WEIGHT: 140 LBS | OXYGEN SATURATION: 98 % | HEART RATE: 76 BPM | RESPIRATION RATE: 16 BRPM | DIASTOLIC BLOOD PRESSURE: 78 MMHG | BODY MASS INDEX: 23.9 KG/M2 | SYSTOLIC BLOOD PRESSURE: 120 MMHG | HEIGHT: 64 IN

## 2024-04-05 DIAGNOSIS — Z13.220 SCREENING, LIPID: ICD-10-CM

## 2024-04-05 DIAGNOSIS — F32.A ANXIETY AND DEPRESSION: ICD-10-CM

## 2024-04-05 DIAGNOSIS — Z00.01 ENCOUNTER FOR PREVENTATIVE ADULT HEALTH CARE EXAM WITH ABNORMAL FINDINGS: Primary | ICD-10-CM

## 2024-04-05 DIAGNOSIS — F41.9 ANXIETY AND DEPRESSION: ICD-10-CM

## 2024-04-05 DIAGNOSIS — R53.83 OTHER FATIGUE: ICD-10-CM

## 2024-04-05 DIAGNOSIS — Z13.1 SCREENING FOR DIABETES MELLITUS: ICD-10-CM

## 2024-04-05 RX ORDER — CYCLOBENZAPRINE HCL 5 MG
5 TABLET ORAL 3 TIMES DAILY PRN
Qty: 90 TABLET | Refills: 2 | Status: SHIPPED | OUTPATIENT
Start: 2024-04-05

## 2024-04-05 NOTE — ASSESSMENT & PLAN NOTE
Patient's depression is a recurrent episode that is moderate without psychosis. Depression is in partial remission and improving with lifestyle modifications.    Plan:   Medication  dose was adjusted;  increase Zoloft to 75 mg    Followup in 6 months.

## 2024-04-05 NOTE — PROGRESS NOTES
"Chief Complaint  Annual Exam, Med Refill, Depression, and Anxiety    Subjective        Maya Fields presents to [facility] for evaluation of the above complaints.    History of Present Illness  Reports significant improvement of anxiety after starting Zoloft.  Feels dose needs to be slightly improved but says anxiety overall is much better and she feels like doing more of the things she used to like to do than before.  No problems voiced with sleeping.  No SI/HI.  Started taking the muscle relaxers as needed for tension in the neck and said her muscles feel better than they have in years.  Says overall she is doing very well.  She is due for labs.    Immunizations:      - Tetanus: Unknown or >10 years ago. Recommend to have at pharmacy or on injury.      - Influenza: Recommend yearly.      - Prevnar: N/A    - Shingrix: Series after 50      - COVID: Recommended per CDC guidelines.  Declines      -RSV: N/A  CRC screening: Plan to start age 43.  Is already established with GI due to her history of Crohn's disease  Mammogram: No early onset family history of breast cancer.  Plan to start age 40-45  PAP:2/13/24 and the result was: normal PAP with negative HPV. Repeat 3 years.   DEXA: DEXA scan at 63   Low dose CT chest: N/A  Mental health concerns: See above  Smoking status:  Tobacco Use: Medium Risk (4/5/2024)    Patient History     Smoking Tobacco Use: Former     Smokeless Tobacco Use: Never     Passive Exposure: Past     Review of Systems - History obtained from the patient  General ROS: negative  Respiratory ROS: no cough, shortness of breath, or wheezing  Cardiovascular ROS: no chest pain or dyspnea on exertion  Gastrointestinal ROS: no abdominal pain, change in bowel habits, or black or bloody stools      Objective   Vital Signs:  /78 (BP Location: Left arm, Patient Position: Sitting, Cuff Size: Adult)   Pulse 76   Resp 16   Ht 162.6 cm (64\")   Wt 63.5 kg (140 lb) " "  SpO2 98%   BMI 24.03 kg/m²   Estimated body mass index is 24.03 kg/m² as calculated from the following:    Height as of this encounter: 162.6 cm (64\").    Weight as of this encounter: 63.5 kg (140 lb).           Physical Exam  Vitals reviewed.   Constitutional:       General: She is not in acute distress.     Appearance: Normal appearance. She is not ill-appearing.   Neck:      Thyroid: No thyroid mass, thyromegaly or thyroid tenderness.      Trachea: Trachea and phonation normal.   Cardiovascular:      Rate and Rhythm: Normal rate and regular rhythm.      Pulses: Normal pulses.      Heart sounds: Normal heart sounds. No murmur heard.     No friction rub. No gallop.   Pulmonary:      Effort: Pulmonary effort is normal. No respiratory distress.      Breath sounds: Normal breath sounds. No wheezing.   Musculoskeletal:      Cervical back: Normal range of motion and neck supple.   Lymphadenopathy:      Cervical: No cervical adenopathy.   Skin:     General: Skin is warm and dry.      Capillary Refill: Capillary refill takes less than 2 seconds.   Neurological:      General: No focal deficit present.      Mental Status: She is alert and oriented to person, place, and time.   Psychiatric:         Mood and Affect: Mood normal.         Behavior: Behavior normal.         Thought Content: Thought content normal.         Judgment: Judgment normal.        Result Review :  The following data was reviewed by: MARGOT Mittal on 04/05/2024:  CMP          11/6/2023    10:32   CMP   Glucose 96    BUN 15    Creatinine 1.00    EGFR 75.5    Sodium 144    Potassium 3.5    Chloride 107    Calcium 9.2    Total Protein 7.3    Albumin 4.3    Globulin 3.0    Total Bilirubin 0.4    Alkaline Phosphatase 59    AST (SGOT) 12    ALT (SGPT) 8    Albumin/Globulin Ratio 1.4    BUN/Creatinine Ratio 15.0    Anion Gap 11.0      CBC          11/6/2023    10:32   CBC   WBC 9.93    RBC 4.47    Hemoglobin 12.0    Hematocrit 38.3    MCV 85.7  "   [facility] 26.8    MCHC 31.3    RDW 15.4    Platelets 429                         Assessment and Plan   Diagnoses and all orders for this visit:    1. Encounter for preventative adult health care exam with abnormal findings (Primary)  -     Hemoglobin A1c; Future  -     CBC (No Diff); Future  -     Comprehensive metabolic panel; Future  -     Lipid panel; Future    2. Anxiety and depression  Assessment & Plan:  Patient's depression is a recurrent episode that is moderate without psychosis. Depression is in partial remission and improving with lifestyle modifications.    Plan:   Medication  dose was adjusted;  increase Zoloft to 75 mg    Followup in 6 months.     Orders:  -     sertraline (Zoloft) 50 MG tablet; Take 1.5 tablets by mouth Daily.  Dispense: 45 tablet; Refill: 2      -     TSH; Future    3. Screening for diabetes mellitus  -     Hemoglobin A1c; Future    4. Screening, lipid  -     Lipid panel; Future      -     cyclobenzaprine (FLEXERIL) 5 MG tablet; Take 1 tablet by mouth 3 (Three) Times a Day As Needed for Muscle Spasms.  Dispense: 90 tablet; Refill: 2           I spent 30 minutes caring for Maya on this date of service. This time includes time spent by me in the following activities:preparing for the visit, reviewing tests, obtaining and/or reviewing a separately obtained history, performing a medically appropriate examination and/or evaluation , counseling and educating the patient/family/caregiver, ordering medications, tests, or procedures, and documenting information in the medical record  Follow Up   Return in about 6 months (around 10/5/2024) for Video visit.  Patient was given instructions and counseling regarding her condition or for health maintenance advice. Please see specific information pulled into the AVS if appropriate.

## 2024-10-01 DIAGNOSIS — F32.A ANXIETY AND DEPRESSION: ICD-10-CM

## 2024-10-01 DIAGNOSIS — F41.9 ANXIETY AND DEPRESSION: ICD-10-CM

## 2024-10-09 NOTE — PROGRESS NOTES
Patient presents for video appointment.   You have chosen to receive care through a telehealth visit.  Do you consent to use a video/audio connection for your medical care today? Yes. Located in her vehicle in [place], [place]     CC: 3 month follow up anxiety     History:  Maya Fields is a 34 y.o. female  who presents today via Telehealth visit for a three month follow up of anxiety.     At last visit I increased zoloft from 50 mg to 75. She tells me she decreased the dose back down to 50 mg daily because at the 75 mg dose she was experiencing increased drowsiness. Says she is doing well on the 50 mg daily and feels anxiety is controlled. Says she has had a few panic attacks which were short lived. She endorse any suicidal or homicidal ideation. Says overall she is feeling well. Says she is also still taking flexeril as needed for tension headaches and neck tension. Does not take on regular basis but says it works well when she needs it.          ROS:  Review of Systems   HENT: Negative.     Respiratory: Negative.     Cardiovascular: Negative.    Gastrointestinal: Negative.    Neurological: Negative.    Psychiatric/Behavioral: Negative.          reports that she has quit smoking. She has been exposed to tobacco smoke. She has never used smokeless tobacco. She reports that she does not drink alcohol and does not use drugs.      Current Outpatient Medications:     Cholecalciferol (vitamin D3) 125 MCG (5000 UT) capsule capsule, Take 1 capsule by mouth Daily., Disp: , Rfl:     cyclobenzaprine (FLEXERIL) 5 MG tablet, Take 1 tablet by mouth 3 (Three) Times a Day As Needed for Muscle Spasms., Disp: 90 tablet, Rfl: 2    folic acid (FOLVITE) 1 MG tablet, Take 1 tablet by mouth Daily., Disp: , Rfl:     lansoprazole (PREVACID) 15 MG capsule, Take 1 capsule by mouth Daily., Disp: , Rfl:     Tacos Root (TACOS PO), Take  by mouth., Disp: , Rfl:     magnesium gluconate (MAGONATE) 500 MG tablet, Take 1 tablet by mouth Daily.,  Disp: , Rfl:     sertraline (Zoloft) 50 MG tablet, Take 1 tablet by mouth Daily., Disp: 90 tablet, Rfl: 1    Vitamin A 3 MG (79981 UT) capsule, Take 1 capsule by mouth Daily., Disp: , Rfl:     vitamin B-12 (CYANOCOBALAMIN) 100 MCG tablet, Take 0.5 tablets by mouth Daily., Disp: , Rfl:     ferrous sulfate 325 (65 FE) MG tablet, Take 1 tablet by mouth Daily With Breakfast., Disp: , Rfl:     OBJECTIVE:  There were no vitals taken for this visit.   Physical Exam  Constitutional:       Appearance: Normal appearance.   Pulmonary:      Effort: Pulmonary effort is normal.   Neurological:      General: No focal deficit present.      Mental Status: She is alert and oriented to person, place, and time.   Psychiatric:         Mood and Affect: Mood normal.         Behavior: Behavior normal.         Thought Content: Thought content normal.         Judgment: Judgment normal.     Limited physical exam as this is a telehealth visit.     Assessment/Plan     Diagnoses and all orders for this visit:    1. Anxiety and depression (Primary)  Assessment & Plan:  Patient's depression is a recurrent episode that is moderate without psychosis. Depression is in full remission and improving with treatment.    Plan:   Continue current medication therapy     Followup in 6 months.     Orders:  -     sertraline (Zoloft) 50 MG tablet; Take 1 tablet by mouth Daily.  Dispense: 90 tablet; Refill: 1    2. Cervical pain (neck)  -     cyclobenzaprine (FLEXERIL) 5 MG tablet; Take 1 tablet by mouth 3 (Three) Times a Day As Needed for Muscle Spasms.  Dispense: 90 tablet; Refill: 2        An After Visit Summary was printed and given to the patient at discharge.  No follow-ups on file.

## 2024-10-10 ENCOUNTER — PATIENT MESSAGE (OUTPATIENT)
Dept: INTERNAL MEDICINE | Facility: CLINIC | Age: 34
End: 2024-10-10
Payer: COMMERCIAL

## 2024-10-10 NOTE — TELEPHONE ENCOUNTER
From: Maya Fields  To: Lanette Terry  Sent: 10/10/2024 11:07 AM CDT  Subject: Appt info    Hey there! I’m scheduled for a virtual visit tmrw with the APRN. I don’t remember her saying anything about labs prior. She had ordered some awhile back that I had completed outside of [facility] and I have seen her since in office. Just wanting to make sure I don’t need any labs prior to my visit. Thanks

## 2024-10-11 ENCOUNTER — TELEMEDICINE (OUTPATIENT)
Dept: INTERNAL MEDICINE | Facility: CLINIC | Age: 34
End: 2024-10-11
Payer: COMMERCIAL

## 2024-10-11 DIAGNOSIS — F32.A ANXIETY AND DEPRESSION: Primary | ICD-10-CM

## 2024-10-11 DIAGNOSIS — M54.2 CERVICAL PAIN (NECK): ICD-10-CM

## 2024-10-11 DIAGNOSIS — F41.9 ANXIETY AND DEPRESSION: Primary | ICD-10-CM

## 2024-10-11 PROCEDURE — 99213 OFFICE O/P EST LOW 20 MIN: CPT | Performed by: NURSE PRACTITIONER

## 2024-10-11 RX ORDER — CYCLOBENZAPRINE HCL 5 MG
5 TABLET ORAL 3 TIMES DAILY PRN
Qty: 90 TABLET | Refills: 2 | Status: SHIPPED | OUTPATIENT
Start: 2024-10-11

## 2024-10-11 NOTE — ASSESSMENT & PLAN NOTE
Patient's depression is a recurrent episode that is moderate without psychosis. Depression is in full remission and improving with treatment.    Plan:   Continue current medication therapy     Followup in 6 months.

## 2025-02-27 ENCOUNTER — OFFICE VISIT (OUTPATIENT)
Dept: OBSTETRICS AND GYNECOLOGY | Age: 35
End: 2025-02-27
Payer: COMMERCIAL

## 2025-02-27 VITALS
HEIGHT: 64 IN | SYSTOLIC BLOOD PRESSURE: 126 MMHG | BODY MASS INDEX: 23.73 KG/M2 | DIASTOLIC BLOOD PRESSURE: 78 MMHG | WEIGHT: 139 LBS

## 2025-02-27 DIAGNOSIS — Z01.419 WELL WOMAN EXAM WITH ROUTINE GYNECOLOGICAL EXAM: Primary | ICD-10-CM

## 2025-02-27 DIAGNOSIS — Z12.31 ENCOUNTER FOR SCREENING MAMMOGRAM FOR MALIGNANT NEOPLASM OF BREAST: ICD-10-CM

## 2025-02-27 DIAGNOSIS — Z12.4 SCREENING FOR CERVICAL CANCER: ICD-10-CM

## 2025-02-27 PROCEDURE — 87624 HPV HI-RISK TYP POOLED RSLT: CPT

## 2025-02-27 PROCEDURE — G0123 SCREEN CERV/VAG THIN LAYER: HCPCS

## 2025-03-04 DIAGNOSIS — B96.89 BV (BACTERIAL VAGINOSIS): Primary | ICD-10-CM

## 2025-03-04 DIAGNOSIS — N76.0 BV (BACTERIAL VAGINOSIS): Primary | ICD-10-CM

## 2025-03-04 RX ORDER — METRONIDAZOLE 500 MG/1
500 TABLET ORAL 2 TIMES DAILY
Qty: 14 TABLET | Refills: 0 | Status: SHIPPED | OUTPATIENT
Start: 2025-03-04 | End: 2025-03-11

## 2025-04-08 NOTE — PROGRESS NOTES
Patient presents for video appointment.   You have chosen to receive care through a telehealth visit.  Do you consent to use a video/audio connection for your medical care today? Yes   She is located at her job in [place], [place]     CC: Follow up anxiety and medication refill     History:  Maya Fields is a 34 y.o. female for the above complaint.  Over the past 6 months she denies any changes in the anxiety.  Says she still feels it stable on current dose of Zoloft.  Has had some panic attacks but those have been provoked.  She denies any worsening depression.  Denies suicidal or homicidal ideation.  She also takes Flexeril as needed for neck pain and tension.  She needs a refill.  We had thought previously this was related to anxiety as well.  Overall says she is doing well and denies any acute problems or concerns today.           ROS:  Review of Systems   Constitutional: Negative.    Respiratory: Negative.     Cardiovascular: Negative.    Neurological: Negative.    Psychiatric/Behavioral: Negative.          reports that she has quit smoking. She has been exposed to tobacco smoke. She has never used smokeless tobacco. She reports that she does not drink alcohol and does not use drugs.      Current Outpatient Medications:     Cholecalciferol (vitamin D3) 125 MCG (5000 UT) capsule capsule, Take 1 capsule by mouth Daily., Disp: , Rfl:     cyclobenzaprine (FLEXERIL) 5 MG tablet, Take 1 tablet by mouth 3 (Three) Times a Day As Needed for Muscle Spasms., Disp: 90 tablet, Rfl: 2    ferrous sulfate 325 (65 FE) MG tablet, Take 1 tablet by mouth Daily With Breakfast., Disp: , Rfl:     folic acid (FOLVITE) 1 MG tablet, Take 1 tablet by mouth Daily., Disp: , Rfl:     lansoprazole (PREVACID) 15 MG capsule, Take 1 capsule by mouth Daily., Disp: , Rfl:     Tacos Root (TACOS PO), Take  by mouth., Disp: , Rfl:     magnesium gluconate (MAGONATE) 500 MG tablet, Take 1 tablet by mouth Daily., Disp: , Rfl:     sertraline (Zoloft) 50  MG tablet, Take 1 tablet by mouth Daily., Disp: 90 tablet, Rfl: 1    Vitamin A 3 MG (53671 UT) capsule, Take 1 capsule by mouth Daily., Disp: , Rfl:     vitamin B-12 (CYANOCOBALAMIN) 100 MCG tablet, Take 0.5 tablets by mouth Daily., Disp: , Rfl:     OBJECTIVE:  There were no vitals taken for this visit.   Physical Exam  Constitutional:       Appearance: Normal appearance.   Pulmonary:      Effort: Pulmonary effort is normal.   Neurological:      General: No focal deficit present.      Mental Status: She is alert and oriented to person, place, and time.   Psychiatric:         Mood and Affect: Mood normal.         Behavior: Behavior normal.         Thought Content: Thought content normal.         Judgment: Judgment normal.     Limited PE as this is a telehealth visit.     Assessment/Plan     Diagnoses and all orders for this visit:    1. Anxiety and depression  Assessment & Plan:  Patient's depression is a single episode that is mild without psychosis. Depression is in full remission and stable.    Plan:   Continue current medication therapy     Followup in 6 months  .     Orders:  -     sertraline (Zoloft) 50 MG tablet; Take 1 tablet by mouth Daily.  Dispense: 90 tablet; Refill: 1    2. Cervical pain (neck)  Comments:  Stable.  Continue.  Flexeril.  Reassess 6 months  Orders:  -     cyclobenzaprine (FLEXERIL) 5 MG tablet; Take 1 tablet by mouth 3 (Three) Times a Day As Needed for Muscle Spasms.  Dispense: 90 tablet; Refill: 2        An After Visit Summary was printed and given to the patient at discharge.  Return in about 6 months (around 10/11/2025) for Recheck, Annual physical.

## 2025-04-11 ENCOUNTER — TELEMEDICINE (OUTPATIENT)
Dept: INTERNAL MEDICINE | Facility: CLINIC | Age: 35
End: 2025-04-11
Payer: COMMERCIAL

## 2025-04-11 DIAGNOSIS — F41.9 ANXIETY AND DEPRESSION: ICD-10-CM

## 2025-04-11 DIAGNOSIS — F32.A ANXIETY AND DEPRESSION: ICD-10-CM

## 2025-04-11 DIAGNOSIS — M54.2 CERVICAL PAIN (NECK): ICD-10-CM

## 2025-04-11 PROCEDURE — 99213 OFFICE O/P EST LOW 20 MIN: CPT | Performed by: NURSE PRACTITIONER

## 2025-04-11 RX ORDER — CYCLOBENZAPRINE HCL 5 MG
5 TABLET ORAL 3 TIMES DAILY PRN
Qty: 90 TABLET | Refills: 2 | Status: SHIPPED | OUTPATIENT
Start: 2025-04-11

## (undated) DEVICE — SKIN AFFIX SURG ADHESIVE 72/CS 0.55ML: Brand: MEDLINE

## (undated) DEVICE — APPL CHLORAPREP W/TINT 26ML ORNG

## (undated) DEVICE — ANTIBACTERIAL VIOLET BRAIDED (POLYGLACTIN 910), SYNTHETIC ABSORBABLE SUTURE: Brand: COATED VICRYL

## (undated) DEVICE — PAD C-SECTION: Brand: MEDLINE INDUSTRIES, INC.

## (undated) DEVICE — SUT MNCRYL 4/0 PS2 27IN UD MCP426H

## (undated) DEVICE — SLV SCD KN ADJ EXPRSS LG

## (undated) DEVICE — ANTIBACTERIAL UNDYED BRAIDED (POLYGLACTIN 910), SYNTHETIC ABSORBABLE SUTURE: Brand: COATED VICRYL

## (undated) DEVICE — GLV SURG SENSICARE SLT PF LF 6 STRL

## (undated) DEVICE — Device